# Patient Record
Sex: MALE | Race: WHITE | NOT HISPANIC OR LATINO | Employment: FULL TIME | URBAN - NONMETROPOLITAN AREA
[De-identification: names, ages, dates, MRNs, and addresses within clinical notes are randomized per-mention and may not be internally consistent; named-entity substitution may affect disease eponyms.]

---

## 2017-12-14 ENCOUNTER — HISTORY (OUTPATIENT)
Dept: FAMILY MEDICINE | Facility: OTHER | Age: 22
End: 2017-12-14

## 2017-12-14 ENCOUNTER — OFFICE VISIT - GICH (OUTPATIENT)
Dept: FAMILY MEDICINE | Facility: OTHER | Age: 22
End: 2017-12-14

## 2017-12-14 ENCOUNTER — HOSPITAL ENCOUNTER (OUTPATIENT)
Dept: RADIOLOGY | Facility: OTHER | Age: 22
End: 2017-12-14
Attending: NURSE PRACTITIONER

## 2017-12-14 DIAGNOSIS — R07.81 PLEURODYNIA: ICD-10-CM

## 2017-12-14 DIAGNOSIS — R23.8 OTHER SKIN CHANGES: ICD-10-CM

## 2018-02-09 VITALS — HEART RATE: 76 BPM | WEIGHT: 277.25 LBS | DIASTOLIC BLOOD PRESSURE: 78 MMHG | SYSTOLIC BLOOD PRESSURE: 104 MMHG

## 2018-02-12 NOTE — PATIENT INSTRUCTIONS
Patient Information     Patient Name MRN Fortunato Brown [id number] Male 1995      Patient Instructions by Jayashree Gonsalves NP at 2017  1:30 PM     Author:  Jayashree Gonsalves NP Service:  (none) Author Type:  PHYS- Nurse Practitioner     Filed:  2017  2:09 PM Encounter Date:  2017 Status:  Signed     :  Jayashree Gonsalves NP (PHYS- Nurse Practitioner)            Use ice as needed for pain--avoid twisting or strain    Follow-up with chiropractor as discussed--- return to clinic if no improvement with physical therapy

## 2018-02-12 NOTE — NURSING NOTE
Patient Information     Patient Name MRN Fortunato Brown [id number] Male 1995      Nursing Note by Ursula Hughes at 2017  1:30 PM     Author:  Ursula Hughes Service:  (none) Author Type:  (none)     Filed:  2017  1:35 PM Encounter Date:  2017 Status:  Signed     :  Ursula Hughes            Patient presents to clinic today for left sided rib pain in front and back. Patient states it started about 3-4 days ago. He states he hasn't sustained an injury.    Ursula Hughes LPN...................2017  1:23 PM

## 2018-02-12 NOTE — PROGRESS NOTES
"Patient Information     Patient Name MRN Sex Fortunato Campos [id number] Male 1995      Progress Notes by Jayashree Gonsalves NP at 2017  1:30 PM     Author:  Jayashree Gonsalves NP Service:  (none) Author Type:  PHYS- Nurse Practitioner     Filed:  2017  5:55 PM Encounter Date:  2017 Status:  Signed     :  Jayashree Gonslaves NP (PHYS- Nurse Practitioner)            SUBJECTIVE:    Fortunato Ocasio is a 22 y.o. male who presents for 4 day history of left-sided lower anterior rib pain that radiates laterally, denies any injury or fall. Works in  shop. Denies any recent respiratory illness or frequent cough.  Denies any shortness of breath. Pain worse with upper body activity and lifting, does not bother when sitting still. No tobacco use.  Also wanted to look at a small purple lesion on his left forearm, reports his been there for a long time possibly 10 years, is not tender painful or has changed.    HPI    Allergies      Allergen   Reactions     Penicillins  *Unknown     All \"cillins\"    ,   Family History      Problem  Relation Age of Onset     Diabetes Mother    ,   No current outpatient prescriptions on file prior to visit.     No current facility-administered medications on file prior to visit.    , No current outpatient prescriptions on file.  Medications have been reviewed by me and are current to the best of my knowledge and ability., No past medical history on file.,   Patient Active Problem List      Diagnosis Date Noted     Venous lake 2017   ,   Past Surgical History:      Procedure  Laterality Date     TONSIL AND ADENOIDECTOMY       WISDOM TEETH EXTRACTION      and   Social History       Substance Use Topics         Smoking status:   Never Smoker     Smokeless tobacco:   Never Used     Alcohol use   Yes      Comment: Occ        REVIEW OF SYSTEMS:  Review of Systems   Constitutional: Negative.    HENT: Negative.    Eyes: Negative.    Respiratory: Negative.  "   Cardiovascular: Negative.    Gastrointestinal: Negative.    Genitourinary: Negative.    Musculoskeletal: Positive for joint pain.   Skin: Negative.    Neurological: Negative.    Endo/Heme/Allergies: Negative.    Psychiatric/Behavioral: Negative.        OBJECTIVE:  /78 (Cuff Site: Right Arm, Position: Sitting, Cuff Size: Adult Large)  Pulse 76  Wt 125.8 kg (277 lb 4 oz)    EXAM:   Physical Exam   Constitutional: He is oriented to person, place, and time and well-developed, well-nourished, and in no distress.   Cardiovascular: Normal rate.    Pulmonary/Chest: Effort normal and breath sounds normal. No respiratory distress. He has no wheezes. He has no rales. He exhibits tenderness.   Musculoskeletal: Normal range of motion. He exhibits tenderness. He exhibits no edema or deformity.   Tenderness with palpation left lower anterior ribs, no crepitus, erythema or edema.   Neurological: He is alert and oriented to person, place, and time. Gait normal.   Skin: Skin is warm and dry.   4 mm purple vascular appearing lesion left forearm, no surrounding edema or erythema, blanches with palpation  nontender  appearance of venous lake   Psychiatric: Mood, memory, affect and judgment normal.   Nursing note and vitals reviewed.      ASSESSMENT/PLAN:    ICD-10-CM    1. Rib pain on left side R07.81 XR RIBS LEFT AND PA CHEST MINIMUM 3 VIEWS   2. Venous lake R23.8         Plan:  Recommend chiropractic or physical therapy--plans to used on Alex for deep tissue and myofascial release  use ice, ibuprofen avoid twisting or strain    10 year stable vascular purple lesion on left forearm--looks like venous lake--- recommended to monitor  and could follow-up with dermatology for definitive diagnosis--declines                      PROCEDURE:  XR RIBS LEFT AND PA CHEST MINIMUM 3 VIEWS     HISTORY: Rib pain on left side.     COMPARISON:  None.     FINDINGS:   The cardiac silhouette is normal in size. The pulmonary vasculature is  normal.  There is a probable nipple shadow in the left lung base. The lungs are otherwise clear. No pleural effusion or pneumothorax.     3 views of the left ribs were obtained. No acute rib fracture or destructive osseous lesion.     IMPRESSION:  Negative chest and left rib x-rays.       Electronically Signed By: Saima Pruett M.D. on 12/14/2017 2:03 PM

## 2018-11-19 ENCOUNTER — OFFICE VISIT (OUTPATIENT)
Dept: FAMILY MEDICINE | Facility: OTHER | Age: 23
End: 2018-11-19
Attending: FAMILY MEDICINE
Payer: COMMERCIAL

## 2018-11-19 VITALS — DIASTOLIC BLOOD PRESSURE: 64 MMHG | RESPIRATION RATE: 16 BRPM | SYSTOLIC BLOOD PRESSURE: 122 MMHG

## 2018-11-19 DIAGNOSIS — R19.7 DIARRHEA OF PRESUMED INFECTIOUS ORIGIN: Primary | ICD-10-CM

## 2018-11-19 PROBLEM — R23.8 VENOUS LAKE: Status: ACTIVE | Noted: 2017-12-14

## 2018-11-19 LAB
ALBUMIN SERPL-MCNC: 4.4 G/DL (ref 3.5–5.7)
ALP SERPL-CCNC: 57 U/L (ref 34–104)
ALT SERPL W P-5'-P-CCNC: 28 U/L (ref 7–52)
ANION GAP SERPL CALCULATED.3IONS-SCNC: 6 MMOL/L (ref 3–14)
AST SERPL W P-5'-P-CCNC: 22 U/L (ref 13–39)
BASOPHILS # BLD AUTO: 0.1 10E9/L (ref 0–0.2)
BASOPHILS NFR BLD AUTO: 1 %
BILIRUB SERPL-MCNC: 1.2 MG/DL (ref 0.3–1)
BUN SERPL-MCNC: 11 MG/DL (ref 7–25)
CALCIUM SERPL-MCNC: 9.7 MG/DL (ref 8.6–10.3)
CHLORIDE SERPL-SCNC: 104 MMOL/L (ref 98–107)
CO2 SERPL-SCNC: 28 MMOL/L (ref 21–31)
CREAT SERPL-MCNC: 0.86 MG/DL (ref 0.7–1.3)
DIFFERENTIAL METHOD BLD: NORMAL
EOSINOPHIL # BLD AUTO: 0 10E9/L (ref 0–0.7)
EOSINOPHIL NFR BLD AUTO: 0.2 %
ERYTHROCYTE [DISTWIDTH] IN BLOOD BY AUTOMATED COUNT: 11.9 % (ref 10–15)
GFR SERPL CREATININE-BSD FRML MDRD: >90 ML/MIN/1.7M2
GLUCOSE SERPL-MCNC: 80 MG/DL (ref 70–105)
HCT VFR BLD AUTO: 47.2 % (ref 40–53)
HGB BLD-MCNC: 16.7 G/DL (ref 13.3–17.7)
IMM GRANULOCYTES # BLD: 0 10E9/L (ref 0–0.4)
IMM GRANULOCYTES NFR BLD: 0.2 %
LYMPHOCYTES # BLD AUTO: 1.5 10E9/L (ref 0.8–5.3)
LYMPHOCYTES NFR BLD AUTO: 24.5 %
MCH RBC QN AUTO: 30.9 PG (ref 26.5–33)
MCHC RBC AUTO-ENTMCNC: 35.4 G/DL (ref 31.5–36.5)
MCV RBC AUTO: 87 FL (ref 78–100)
MONOCYTES # BLD AUTO: 0.6 10E9/L (ref 0–1.3)
MONOCYTES NFR BLD AUTO: 9.4 %
NEUTROPHILS # BLD AUTO: 3.9 10E9/L (ref 1.6–8.3)
NEUTROPHILS NFR BLD AUTO: 64.7 %
PLATELET # BLD AUTO: 213 10E9/L (ref 150–450)
POTASSIUM SERPL-SCNC: 4.1 MMOL/L (ref 3.5–5.1)
PROT SERPL-MCNC: 6.7 G/DL (ref 6.4–8.9)
RBC # BLD AUTO: 5.41 10E12/L (ref 4.4–5.9)
SODIUM SERPL-SCNC: 138 MMOL/L (ref 134–144)
TSH SERPL DL<=0.05 MIU/L-ACNC: 2.19 IU/ML (ref 0.34–5.6)
WBC # BLD AUTO: 6.1 10E9/L (ref 4–11)

## 2018-11-19 PROCEDURE — 99213 OFFICE O/P EST LOW 20 MIN: CPT | Performed by: FAMILY MEDICINE

## 2018-11-19 PROCEDURE — 83516 IMMUNOASSAY NONANTIBODY: CPT | Performed by: FAMILY MEDICINE

## 2018-11-19 PROCEDURE — 85025 COMPLETE CBC W/AUTO DIFF WBC: CPT | Performed by: FAMILY MEDICINE

## 2018-11-19 PROCEDURE — 36415 COLL VENOUS BLD VENIPUNCTURE: CPT | Performed by: FAMILY MEDICINE

## 2018-11-19 PROCEDURE — 84443 ASSAY THYROID STIM HORMONE: CPT | Performed by: FAMILY MEDICINE

## 2018-11-19 PROCEDURE — 80053 COMPREHEN METABOLIC PANEL: CPT | Performed by: FAMILY MEDICINE

## 2018-11-19 PROCEDURE — 83516 IMMUNOASSAY NONANTIBODY: CPT | Mod: 91 | Performed by: FAMILY MEDICINE

## 2018-11-19 ASSESSMENT — ENCOUNTER SYMPTOMS
DIARRHEA: 1
CONSTIPATION: 0
FATIGUE: 0
UNEXPECTED WEIGHT CHANGE: 0
NAUSEA: 0
VOMITING: 0
HEMATOCHEZIA: 0
ABDOMINAL PAIN: 0
ABDOMINAL DISTENTION: 0

## 2018-11-19 ASSESSMENT — PAIN SCALES - GENERAL: PAINLEVEL: NO PAIN (0)

## 2018-11-19 ASSESSMENT — ANXIETY QUESTIONNAIRES
3. WORRYING TOO MUCH ABOUT DIFFERENT THINGS: NOT AT ALL
6. BECOMING EASILY ANNOYED OR IRRITABLE: NOT AT ALL
2. NOT BEING ABLE TO STOP OR CONTROL WORRYING: NOT AT ALL
GAD7 TOTAL SCORE: 0
7. FEELING AFRAID AS IF SOMETHING AWFUL MIGHT HAPPEN: NOT AT ALL
1. FEELING NERVOUS, ANXIOUS, OR ON EDGE: NOT AT ALL
IF YOU CHECKED OFF ANY PROBLEMS ON THIS QUESTIONNAIRE, HOW DIFFICULT HAVE THESE PROBLEMS MADE IT FOR YOU TO DO YOUR WORK, TAKE CARE OF THINGS AT HOME, OR GET ALONG WITH OTHER PEOPLE: NOT DIFFICULT AT ALL
5. BEING SO RESTLESS THAT IT IS HARD TO SIT STILL: NOT AT ALL

## 2018-11-19 ASSESSMENT — PATIENT HEALTH QUESTIONNAIRE - PHQ9: 5. POOR APPETITE OR OVEREATING: NOT AT ALL

## 2018-11-19 NOTE — PROGRESS NOTES
"  SUBJECTIVE:   Fortunato Ocasio is a 23 year old male who presents to clinic today for the following health issues:    HPI  Diarrhea.  Has had this for about 6 weeks now or so.  At first, he thought it was lactose related.  Would get gas form eating cheese.  Stopped all dairy and no changes to the diarrhea.  No abdomen pain.  Having 3-4 watery stools daily, no blood.  No severe odor.  No recent illness, no known exposures.  No recent antibiotics.  Weight has been falling for the last 8 months, intentionally.  Down about 25#.    There are no active problems to display for this patient.    Past Surgical History:   Procedure Laterality Date     EXTRACTION(S) DENTAL      No Comments Provided     TONSILLECTOMY, ADENOIDECTOMY, COMBINED      No Comments Provided     Social History   Substance Use Topics     Smoking status: Never Smoker     Smokeless tobacco: Never Used     Alcohol use Yes      Comment: Alcoholic Drinks/day: Occ     No current outpatient prescriptions on file.     Allergies   Allergen Reactions     Penicillins Swelling     All \"cillins\"       Review of Systems   Constitutional: Negative for fatigue and unexpected weight change.   Gastrointestinal: Positive for diarrhea. Negative for abdominal distention, abdominal pain, constipation, hematochezia, nausea and vomiting.   Allergic/Immunologic: Negative for immunocompromised state.        OBJECTIVE:     /64 (BP Location: Right arm, Patient Position: Sitting, Cuff Size: Adult Regular)  Resp 16  There is no height or weight on file to calculate BMI.  Physical Exam   Constitutional: He appears well-developed. No distress.   Cardiovascular: Normal rate, regular rhythm and normal heart sounds.  Exam reveals no gallop and no friction rub.    No murmur heard.  Abdominal: Soft. He exhibits no distension. There is no tenderness. There is no rebound and no guarding.   Skin: He is not diaphoretic.   Psychiatric: He has a normal mood and affect. His behavior is " normal. Thought content normal.           ASSESSMENT/PLAN:         (R19.7) Diarrhea of presumed infectious origin  (primary encounter diagnosis)  Comment: 6 weeks of symptoms.  Discussed with him possible causes, including some parasites.  Bacterial infections are usually not this long in duration apart from c. Diff.  Allergies, including gluten and dairy.  Lastly would be colitis.  If above testing is negative and symptoms persist,then a colonoscopy is in order.  Will have him do a complete dairy free diet for 4-6 weeks as well.  Can add on probiotics too.  Plan: Tissue transglutaminase Ab IgA and IgG, CBC and        Differential, Comprehensive Metabolic Panel,         Clostridium difficile Toxin B PCR, Ova and         Parasite Exam Routine, Giardia antigen, Stool         Culture, TSH                 Isaac Candelario MD  [facility]    Results for orders placed or performed in visit on 11/19/18   Tissue transglutaminase Ab IgA and IgG   Result Value Ref Range    Tissue Transglutaminase Antibody IgA 1 <7 U/mL    Tissue Transglutaminase Alysia IgG 1 <7 U/mL   CBC and Differential   Result Value Ref Range    WBC 6.1 4.0 - 11.0 10e9/L    RBC Count 5.41 4.4 - 5.9 10e12/L    Hemoglobin 16.7 13.3 - 17.7 g/dL    Hematocrit 47.2 40.0 - 53.0 %    MCV 87 78 - 100 fl    MCH 30.9 26.5 - 33.0 pg    MCHC 35.4 31.5 - 36.5 g/dL    RDW 11.9 10.0 - 15.0 %    Platelet Count 213 150 - 450 10e9/L    Diff Method Automated Method     % Neutrophils 64.7 %    % Lymphocytes 24.5 %    % Monocytes 9.4 %    % Eosinophils 0.2 %    % Basophils 1.0 %    % Immature Granulocytes 0.2 %    Absolute Neutrophil 3.9 1.6 - 8.3 10e9/L    Absolute Lymphocytes 1.5 0.8 - 5.3 10e9/L    Absolute Monocytes 0.6 0.0 - 1.3 10e9/L    Absolute Eosinophils 0.0 0.0 - 0.7 10e9/L    Absolute Basophils 0.1 0.0 - 0.2 10e9/L    Abs Immature Granulocytes 0.0 0 - 0.4 10e9/L   Comprehensive Metabolic Panel   Result Value Ref Range    Sodium 138 134 - 144 mmol/L     Potassium 4.1 3.5 - 5.1 mmol/L    Chloride 104 98 - 107 mmol/L    Carbon Dioxide 28 21 - 31 mmol/L    Anion Gap 6 3 - 14 mmol/L    Glucose 80 70 - 105 mg/dL    Urea Nitrogen 11 7 - 25 mg/dL    Creatinine 0.86 0.70 - 1.30 mg/dL    GFR Estimate >90 >60 mL/min/1.7m2    GFR Estimate If Black >90 >60 mL/min/1.7m2    Calcium 9.7 8.6 - 10.3 mg/dL    Bilirubin Total 1.2 (H) 0.3 - 1.0 mg/dL    Albumin 4.4 3.5 - 5.7 g/dL    Protein Total 6.7 6.4 - 8.9 g/dL    Alkaline Phosphatase 57 34 - 104 U/L    ALT 28 7 - 52 U/L    AST 22 13 - 39 U/L   TSH   Result Value Ref Range    Thyrotropin 2.19 0.34 - 5.60 IU/mL

## 2018-11-19 NOTE — LETTER
November 20, 2018      Fortunato Ocasio  [address]  [place] [address]        Dear Fortunato,       This is all normal so far.    Results for orders placed or performed in visit on 11/19/18   Tissue transglutaminase Ab IgA and IgG   Result Value Ref Range    Tissue Transglutaminase Antibody IgA 1 <7 U/mL    Tissue Transglutaminase Alysia IgG 1 <7 U/mL   CBC and Differential   Result Value Ref Range    WBC 6.1 4.0 - 11.0 10e9/L    RBC Count 5.41 4.4 - 5.9 10e12/L    Hemoglobin 16.7 13.3 - 17.7 g/dL    Hematocrit 47.2 40.0 - 53.0 %    MCV 87 78 - 100 fl    MCH 30.9 26.5 - 33.0 pg    MCHC 35.4 31.5 - 36.5 g/dL    RDW 11.9 10.0 - 15.0 %    Platelet Count 213 150 - 450 10e9/L    Diff Method Automated Method     % Neutrophils 64.7 %    % Lymphocytes 24.5 %    % Monocytes 9.4 %    % Eosinophils 0.2 %    % Basophils 1.0 %    % Immature Granulocytes 0.2 %    Absolute Neutrophil 3.9 1.6 - 8.3 10e9/L    Absolute Lymphocytes 1.5 0.8 - 5.3 10e9/L    Absolute Monocytes 0.6 0.0 - 1.3 10e9/L    Absolute Eosinophils 0.0 0.0 - 0.7 10e9/L    Absolute Basophils 0.1 0.0 - 0.2 10e9/L    Abs Immature Granulocytes 0.0 0 - 0.4 10e9/L   Comprehensive Metabolic Panel   Result Value Ref Range    Sodium 138 134 - 144 mmol/L    Potassium 4.1 3.5 - 5.1 mmol/L    Chloride 104 98 - 107 mmol/L    Carbon Dioxide 28 21 - 31 mmol/L    Anion Gap 6 3 - 14 mmol/L    Glucose 80 70 - 105 mg/dL    Urea Nitrogen 11 7 - 25 mg/dL    Creatinine 0.86 0.70 - 1.30 mg/dL    GFR Estimate >90 >60 mL/min/1.7m2    GFR Estimate If Black >90 >60 mL/min/1.7m2    Calcium 9.7 8.6 - 10.3 mg/dL    Bilirubin Total 1.2 (H) 0.3 - 1.0 mg/dL    Albumin 4.4 3.5 - 5.7 g/dL    Protein Total 6.7 6.4 - 8.9 g/dL    Alkaline Phosphatase 57 34 - 104 U/L    ALT 28 7 - 52 U/L    AST 22 13 - 39 U/L   TSH   Result Value Ref Range    Thyrotropin 2.19 0.34 - 5.60 IU/mL         Sincerely,        Isaac Candelario MD

## 2018-11-19 NOTE — NURSING NOTE
coming in with diarrhea times 6 weeks, did stop dairy not better  Ashlyn Puente LPN on 11/19/2018 at 10:28 AM

## 2018-11-19 NOTE — MR AVS SNAPSHOT
After Visit Summary   11/19/2018    Fortunato Ocasio    MRN: [id number]           Patient Information     Date Of Birth          1995        Visit Information        Provider Department      11/19/2018 10:30 AM Isaac Candelario MD [facility]        Today's Diagnoses     Diarrhea of presumed infectious origin    -  1       Follow-ups after your visit        Future tests that were ordered for you today     Open Future Orders        Priority Expected Expires Ordered    TSH Routine  11/19/2019 11/19/2018    Tissue transglutaminase Ab IgA and IgG Routine  11/19/2019 11/19/2018    CBC and Differential Routine  11/19/2019 11/19/2018    Comprehensive Metabolic Panel Routine  11/19/2019 11/19/2018    Clostridium difficile Toxin B PCR Routine  11/19/2019 11/19/2018    Ova and Parasite Exam Routine Routine  11/19/2019 11/19/2018    Giardia antigen Routine  11/19/2019 11/19/2018    Stool culture Routine  11/19/2019 11/19/2018            Who to contact     If you have questions or need follow up information about today's clinic visit or your schedule please contact [facility] directly at [phone].  Normal or non-critical lab and imaging results will be communicated to you by MyChart, letter or phone within 4 business days after the clinic has received the results. If you do not hear from us within 7 days, please contact the clinic through MyChart or phone. If you have a critical or abnormal lab result, we will notify you by phone as soon as possible.  Submit refill requests through [employer] or call your pharmacy and they will forward the refill request to us. Please allow 3 business days for your refill to be completed.          Additional Information About Your Visit        Care EveryWhere ID     This is your Care EveryWhere ID. This could be used by other organizations to access your [facility] medical records  [id number]        Your Vitals Were     Respirations                    16            Blood Pressure from Last 3 Encounters:   11/19/18 122/64   12/14/17 104/78   05/05/14 112/62    Weight from Last 3 Encounters:   12/14/17 277 lb 4 oz (125.8 kg)   11/19/13 272 lb (123.4 kg) (>99 %)*     * Growth percentiles are based on [facility] 2-20 Years data.               Primary Care Provider Office Phone # Fax #    Isaac Candelario [phone]       [address] [facility] [address]        Equal Access to Services     [facility]: Hadii kassie mcghee hadasho Soomaali, waaxda luqadaha, qaybta kaalmada aleisha, tavia busch . So [facility] [phone].    ATENCIÓN: Si habla español, tiene a pearl disposición servicios gratuitos de asistencia lingüística. Ll[facility] [phone].    We comply with applicable federal civil rights laws and [place] laws. We do not discriminate on the basis of race, color, national origin, age, disability, sex, sexual orientation, or gender identity.            Thank you!     Thank you for choosing [facility] AND [facility]  for your care. Our goal is always to provide you with excellent care. Hearing back from our patients is one way we can continue to improve our services. Please take a few minutes to complete the written survey that you may receive in the mail after your visit with us. Thank you!             Your Updated Medication List - Protect others around you: Learn how to safely use, store and throw away your medicines at www.disposemymeds.org.      Notice  As of 11/19/2018 10:51 AM    You have not been prescribed any medications.

## 2018-11-20 LAB
TTG IGA SER-ACNC: 1 U/ML
TTG IGG SER-ACNC: 1 U/ML

## 2018-11-20 ASSESSMENT — ANXIETY QUESTIONNAIRES: GAD7 TOTAL SCORE: 0

## 2019-11-22 ENCOUNTER — OFFICE VISIT (OUTPATIENT)
Dept: FAMILY MEDICINE | Facility: OTHER | Age: 24
End: 2019-11-22
Attending: FAMILY MEDICINE
Payer: COMMERCIAL

## 2019-11-22 VITALS
TEMPERATURE: 97 F | HEART RATE: 70 BPM | HEIGHT: 75 IN | WEIGHT: 282 LBS | SYSTOLIC BLOOD PRESSURE: 112 MMHG | DIASTOLIC BLOOD PRESSURE: 66 MMHG | BODY MASS INDEX: 35.06 KG/M2 | RESPIRATION RATE: 16 BRPM

## 2019-11-22 DIAGNOSIS — K64.4 EXTERNAL HEMORRHOIDS: Primary | ICD-10-CM

## 2019-11-22 PROCEDURE — 99213 OFFICE O/P EST LOW 20 MIN: CPT | Performed by: FAMILY MEDICINE

## 2019-11-22 ASSESSMENT — ENCOUNTER SYMPTOMS
BACK PAIN: 0
HEMATOCHEZIA: 0
RECTAL PAIN: 0
DIARRHEA: 0
ABDOMINAL PAIN: 0
ABDOMINAL DISTENTION: 0
ANAL BLEEDING: 1
FATIGUE: 0

## 2019-11-22 ASSESSMENT — PAIN SCALES - GENERAL: PAINLEVEL: NO PAIN (0)

## 2019-11-22 ASSESSMENT — MIFFLIN-ST. JEOR: SCORE: 2351.59

## 2019-11-22 NOTE — NURSING NOTE
Patient presents to clinic today for blood in stool since Wednesday. He says at first there wasn't much but more last night, now today not so much.  Medication reconciliation completed.  Dennise Costello CMA([facility])..................11/22/2019   1:56 PM

## 2020-03-11 ENCOUNTER — HEALTH MAINTENANCE LETTER (OUTPATIENT)
Age: 25
End: 2020-03-11

## 2020-07-14 NOTE — PROGRESS NOTES
"  SUBJECTIVE:   Fortunato Ocasio is a 24 year old male who presents to clinic today for the following health issues:    HPI  Rectal bleeding. Saw me 1 year ago with diarrhea.  That resolved but has had significant gas pains off and on since.  He changed toothpastes 3 weeks ago and all of this settled down.  No gas at all.  3 days ago had wipe hematochezia.  2nd spell yesterday, mild.  Nearly gone by today.  Has a soft bowel movement twice daily, no changes in this pattern.  No pain with them at all.  No FH of cancer or colitis.  Weight is stable.    Patient Active Problem List    Diagnosis Date Noted     Venous lake 12/14/2017     Priority: Medium     Past Surgical History:   Procedure Laterality Date     EXTRACTION(S) DENTAL      No Comments Provided     TONSILLECTOMY, ADENOIDECTOMY, COMBINED      No Comments Provided     Social History     Tobacco Use     Smoking status: Never Smoker     Smokeless tobacco: Never Used   Substance Use Topics     Alcohol use: Yes     Comment: Alcoholic Drinks/day: Occ     No current outpatient medications on file.     Allergies   Allergen Reactions     Peanuts [Nuts] Headache     Not sure, but notices migraines next day after eating peanuts.      Penicillins Swelling     All \"cillins\"       Review of Systems   Constitutional: Negative for fatigue.   Gastrointestinal: Positive for anal bleeding. Negative for abdominal distention, abdominal pain, diarrhea, hematochezia and rectal pain.   Musculoskeletal: Negative for back pain.        OBJECTIVE:     /66 (BP Location: Right arm, Patient Position: Sitting, Cuff Size: Adult Large)   Pulse 70   Temp 97  F (36.1  C) (Oral)   Resp 16   Ht 1.9 m (6' 2.8\")   Wt 127.9 kg (282 lb)   BMI 35.44 kg/m    Body mass index is 35.44 kg/m .  Physical Exam  Constitutional:       Appearance: Normal appearance.   Genitourinary:     Comments: At 6:00 there is a small, 5 mm or so, non thrombosed hemorrhoid, external.  Overlying this is slightly " macerated skin, without current active bleed  Neurological:      General: No focal deficit present.      Mental Status: He is alert and oriented to person, place, and time.   Psychiatric:         Mood and Affect: Mood normal.         Behavior: Behavior normal.         Diagnostic Test Results:  none     ASSESSMENT/PLAN:         (K64.4) External hemorrhoids  (primary encounter diagnosis)  Comment: the risk for cancer as a cause is very low.    Plan: not thrombosed.  Can use OTC products safely and follow up as needed       Isaac Candelario MD  [facility]     critical total Bilirubil level

## 2020-11-13 ENCOUNTER — OFFICE VISIT (OUTPATIENT)
Dept: FAMILY MEDICINE | Facility: OTHER | Age: 25
End: 2020-11-13
Attending: PHYSICAL MEDICINE & REHABILITATION
Payer: OTHER MISCELLANEOUS

## 2020-11-13 VITALS
OXYGEN SATURATION: 98 % | DIASTOLIC BLOOD PRESSURE: 64 MMHG | HEART RATE: 74 BPM | BODY MASS INDEX: 36.16 KG/M2 | RESPIRATION RATE: 18 BRPM | TEMPERATURE: 97.6 F | SYSTOLIC BLOOD PRESSURE: 126 MMHG | HEIGHT: 75 IN | WEIGHT: 290.8 LBS

## 2020-11-13 DIAGNOSIS — S01.80XA OPEN WOUND OF FACE, INITIAL ENCOUNTER: Primary | ICD-10-CM

## 2020-11-13 PROCEDURE — 250N000009 HC RX 250: Performed by: INTERNAL MEDICINE

## 2020-11-13 PROCEDURE — 90471 IMMUNIZATION ADMIN: CPT | Performed by: INTERNAL MEDICINE

## 2020-11-13 PROCEDURE — 90715 TDAP VACCINE 7 YRS/> IM: CPT | Performed by: INTERNAL MEDICINE

## 2020-11-13 PROCEDURE — 12013 RPR F/E/E/N/L/M 2.6-5.0 CM: CPT | Performed by: INTERNAL MEDICINE

## 2020-11-13 RX ORDER — LIDOCAINE HYDROCHLORIDE AND EPINEPHRINE 10; 10 MG/ML; UG/ML
1 INJECTION, SOLUTION INFILTRATION; PERINEURAL ONCE
Status: COMPLETED | OUTPATIENT
Start: 2020-11-13 | End: 2020-11-13

## 2020-11-13 RX ADMIN — LIDOCAINE HYDROCHLORIDE AND EPINEPHRINE 1 ML: 10; 10 INJECTION, SOLUTION INFILTRATION; PERINEURAL at 14:00

## 2020-11-13 ASSESSMENT — PAIN SCALES - GENERAL: PAINLEVEL: NO PAIN (0)

## 2020-11-13 ASSESSMENT — MIFFLIN-ST. JEOR: SCORE: 2389.69

## 2020-11-13 NOTE — PATIENT INSTRUCTIONS
INSTRUCTIONS TO PATIENTS AND RELATIVES REGARDING LACERATIONS    These instructions are for persons who have had lacerations (jagged or deep wounds or cuts) that require special bandaging and/or stitches.    1.  DO NOT REMOVE the bandage applied by the nurse or physician for 24 hours unless you have received other instructions from you physician.    AFTER 24 HOURS:    2.  KEEP AREA CLEAN, DRY AND COVERED.        *  Clean abrasions and lacerations without stitches 1 -2 times a day with soap and water.      *  DO NO  SOAK or IMMERSE LACERATION in water for a prolonged length of time such as bathing, swimming or washing dishes.      *  APPLY ANTIBIOTIC OINTMENT and a clean bandage.      DO NOT WASH AREAS WITH STITCHS OR ALLOW STITICHS TO BECOME WET. KEEP AREA CLEAN, DRY AND COVERED.      FACIAL AND SCALP LACERAATIONS DO NOT NEED A BANDAGE    3.  TREAT PAIN OR DISCOMFORT WITH:      *  Ibuprofen or Tylenol every four hours.      *  ELEVATE wound to decrease swelling and pain.    4.  WATCH FOR SIGNS OF INFECTION      *  Redness      *  Swelling      *  Pus      *  Red streaks around wound      *  Increased pain      *  Fever      In the event that any of the symptoms listed occur, call your physician or the Emergency Room.     [facility] [place]  [phone]  [facility] [place]  [phone]  [facility] IN [place]  [phone]  [facility]  [phone]  [facility] EMERGENCY ROOM  [phone]    ADDITIONAL INSTRUCTIONS

## 2020-11-13 NOTE — NURSING NOTE
"Chief Complaint   Patient presents with     Laceration     Patient is here for a laceration on the right side of his face that happened at 11:32am. Patient states an exhaust pipe cut his face.    Initial /64   Pulse 74   Temp 97.6  F (36.4  C) (Tympanic)   Resp 18   Ht 1.905 m (6' 3\")   Wt 131.9 kg (290 lb 12.8 oz)   SpO2 98%   BMI 36.35 kg/m   Estimated body mass index is 36.35 kg/m  as calculated from the following:    Height as of this encounter: 1.905 m (6' 3\").    Weight as of this encounter: 131.9 kg (290 lb 12.8 oz).  Medication Reconciliation: complete    Francie Dee LPN  "

## 2020-11-13 NOTE — PROGRESS NOTES
SUBJECTIVE:   25 year old male sustained laceration of face 1 hours ago. Nature of injury: was working with an exhaust pipe when it slipped and fell on caused a laceration on his right cheek.  He reports that the exhaust pipe was new and had never been used prior.  He has had some bleeding from it although this is minimal.  He denies significant pain.    Tetanus vaccination status reviewed: last tetanus booster just under 7 years ago, tetanus re-vaccination completed     OBJECTIVE:   Patient appears well, vitals are normal. Laceration 3.4 cm noted.  Description: clean wound edges, no foreign bodies. Neurovascular and tendon structures are intact.    ASSESSMENT:   Laceration as described.    PLAN:   Anesthesia with Lidocaine 1% with epinephrine. Wound cleansed, debrided of visible foreign material and necrotic tissue, and sutured. 11 sutures completed.  Dressing applied.  Wound care instructions provided.  Observe for any signs of infection or other problems.  Return for suture removal in 5-7 days.

## 2020-12-27 ENCOUNTER — HEALTH MAINTENANCE LETTER (OUTPATIENT)
Age: 25
End: 2020-12-27

## 2021-03-25 ENCOUNTER — HOSPITAL ENCOUNTER (OUTPATIENT)
Dept: GENERAL RADIOLOGY | Facility: OTHER | Age: 26
End: 2021-03-25
Attending: NURSE PRACTITIONER
Payer: COMMERCIAL

## 2021-03-25 ENCOUNTER — OFFICE VISIT (OUTPATIENT)
Dept: FAMILY MEDICINE | Facility: OTHER | Age: 26
End: 2021-03-25
Attending: NURSE PRACTITIONER
Payer: COMMERCIAL

## 2021-03-25 VITALS
SYSTOLIC BLOOD PRESSURE: 102 MMHG | BODY MASS INDEX: 36.68 KG/M2 | TEMPERATURE: 98.5 F | DIASTOLIC BLOOD PRESSURE: 64 MMHG | OXYGEN SATURATION: 98 % | HEIGHT: 75 IN | WEIGHT: 295 LBS | HEART RATE: 81 BPM | RESPIRATION RATE: 18 BRPM

## 2021-03-25 DIAGNOSIS — S99.911A ANKLE INJURY, RIGHT, INITIAL ENCOUNTER: Primary | ICD-10-CM

## 2021-03-25 PROCEDURE — 73610 X-RAY EXAM OF ANKLE: CPT | Mod: RT

## 2021-03-25 PROCEDURE — 99213 OFFICE O/P EST LOW 20 MIN: CPT | Performed by: NURSE PRACTITIONER

## 2021-03-25 ASSESSMENT — PAIN SCALES - GENERAL: PAINLEVEL: NO PAIN (0)

## 2021-03-25 ASSESSMENT — MIFFLIN-ST. JEOR: SCORE: 2408.74

## 2021-03-26 NOTE — PROGRESS NOTES
"HPI:    Fortunato Ocasio is a 25 year old male who presents to [facility] today for an injury to his right ankle. The injury occurred on Saturday. Patient was walking outside in a parkinglot and miss stepped with his left foot and tried to compensate with his right and believes that he rolled his right ankle. Rating his pain 1-2/10. The patient applied ice on Saturday and Sunday. Patient reports taking tylenol on Sunday.     History reviewed. No pertinent past medical history.  Past Surgical History:   Procedure Laterality Date     EXTRACTION(S) DENTAL      No Comments Provided     TONSILLECTOMY, ADENOIDECTOMY, COMBINED      No Comments Provided     Social History     Tobacco Use     Smoking status: Never Smoker     Smokeless tobacco: Never Used   Substance Use Topics     Alcohol use: Yes     Comment: Alcoholic Drinks/day: Occ     No current outpatient medications on file.     Allergies   Allergen Reactions     Peanuts [Nuts] Headache     Not sure, but notices migraines next day after eating peanuts.      Penicillins Swelling     All \"cillins\"         Past medical history, past surgical history, current medications and allergies reviewed and accurate to the best of my knowledge.        ROS:  Refer to HPI    /64   Pulse 81   Temp 98.5  F (36.9  C) (Tympanic)   Resp 18   Ht 1.905 m (6' 3\")   Wt 133.8 kg (295 lb)   SpO2 98%   BMI 36.87 kg/m      EXAM:  General Appearance: Well appearing male, appropriate appearance for age. No acute distress  Musculoskeletal:  Equal movement of bilateral upper extremities.  Equal movement of bilateral lower extremities. Patient is able to dorsiflex and plantarflex his right foot reporting mild pain.  Normal gait.    Dermatological: no rashes noted of exposed skin. NNo erythema or ecchymosis of right ankle. Swelling noted over the right lateral malleolus.   Psychological: normal affect, alert, oriented, and pleasant.         Xray:  Results for orders placed or performed in " visit on 03/25/21   XR Ankle Right G/E 3 Views     Status: None    Narrative    PROCEDURE:  XR ANKLE RT G/E 3 VW    HISTORY: Ankle injury, right, initial encounter    COMPARISON:  None.    TECHNIQUE:  3 views of the right ankle were obtained.    FINDINGS:  No fracture or dislocation is identified. The joint spaces  are preserved.  There are some soft tissue calcifications just distal  to the medial and lateral malleolar line which appear chronic.      Impression    IMPRESSION: No acute fracture.      THERESE DAVIS MD           ASSESSMENT/PLAN:  1. Ankle injury, right, initial encounter    - XR Ankle Right G/E 3 Views    Xray films and radiology report reviewed.     Discussed xray results with the patient and informed him that there does not appear to be an acute fracture or dislocation on xray.     Discussed with the patient that this is most likely a sprain/strain of his right ankle.     The patient's ankle was wrapped with an ACE bandage during today's visit.     Instructed the patient to rest, elevate and ice his right ankle.     May use over-the-counter Tylenol or ibuprofen PRN    Discussed warning signs/symptoms indicative of need to f/u    Follow up if symptoms persist or worsen or concerns      I explained my diagnostic considerations and recommendations to the patient, who voiced understanding and agreement with the treatment plan. All questions were answered. We discussed potential side effects of any prescribed or recommended therapies, as well as expectations for response to treatments.    Disclaimer:  This note consists of words and symbols derived from keyboarding, dictation, or using voice recognition software. As a result, there may be errors in the script that have gone undetected. Please consider this when interpreting information found in this note.

## 2021-03-26 NOTE — PATIENT INSTRUCTIONS
Patient Education     Self-Care for Strains and Sprains  Most minor strains and sprains can be treated with self-care. Recovering from a strain or sprain may take 6 to 8 weeks. Your self-care goal is to reduce pain and immobilize the injury to speed healing.   Support the injured area  Wrapping the injured area provides support for short, necessary activities. Be careful not to wrap the area too tightly. This could cut off the blood supply.     Support a wrist, elbow, or shoulder with a sling.    Wrap an ankle or knee with an elastic bandage.    Tape a finger or toe to the one next to it.  Use cold and heat  Cold reduces swelling. Both cold and heat reduce pain. Heat should not be used in the initial treatment of the injury. When using cold or heat, always place a thin towel between the pack and your skin.     Apply ice or a cold pack 10 to 15 minutes every hour you re awake for the first 2 days.    After the swelling goes down, use cold or heat to control pain. Don t use heat late in the day, since it can cause swelling when you re not active.  Rest and elevate  Rest and elevation help your injury heal faster.    Raise the injured area above your heart level.    Keep the injured area from moving.    Limit the use of the joint or limb.  Use medicine    Aspirin reduces pain and swelling. (Note: Don t give aspirin to a child 18 or younger unless prescribed by the doctor.)    Non-steroidal anti-inflammatory medicines, such as ibuprofen, may reduce pain and swelling, as well. Ask your healthcare provider for advice.    When to call your healthcare provider  Call your healthcare provider if:    The injured joint won t move, or bones make a grating sound when they move    You can t put weight on the injured area, even after 24 hours    The injured body part is cold, blue, tingling, or numb    The joint or limb appears bent or crooked.    Pain increases or doesn t improve in 4 days    When pressing along the injured  area, you notice a spot that is especially painful  [employer] last reviewed this educational content on 5/1/2018 2000-2020 [employer]. All rights reserved. This information is not intended as a substitute for professional medical care. Always follow your healthcare professional's instructions.           Patient Education     Treating Strains and Sprains    Strains and sprains happen when muscles or other soft tissues near your bones stretch or tear. These injuries can cause bruising, swelling, and pain. To ease your discomfort and speed the healing of your strain or sprain, follow the tips below. Remember, a strain or sprain can take 6 to 8 weeks to heal.  Important Note: Do not give aspirin to children or teens without discussing it with your healthcare provider first.  Ice first, heat later    Use ice for the first 24 to 48 hours after injury. Ice helps prevent swelling and reduce pain. Ice the injury for no more than 20 minutes at a time and allow at least 20 minutes between icing sessions.    Apply heat after the first 72 hours, once the swelling has gone down. Heat relaxes muscles and increases blood flow. Soak the injured area in warm water or use a heating pad set on low for no more than 15 minutes at a time.  Wrap and elevate    Wrap an injured limb firmly with an elastic bandage. This provides support and helps prevent swelling. Don t wear an elastic bandage overnight. Watch for tingling, numbness, or increased pain. Remove the bandage immediately if any of these occurs.    Elevate the injured area to help reduce swelling and throbbing. It s best to raise an injured limb above the level of your heart.  Medicines    Over-the-counter medicines such as acetaminophen or ibuprofen can help reduce pain. Some also help reduce swelling.    Take medicine only as directed.    Rest the area even if medicines are controlling the pain.  Rest    Rest the injured area by not using it for 24 hours.    When  you re ready, return slowly to your normal activities. Rest the injured area often.    Don t use or walk on an injured limb if it hurts.  [employer] last reviewed this educational content on 1/1/2018 2000-2020 [employer]. All rights reserved. This information is not intended as a substitute for professional medical care. Always follow your healthcare professional's instructions.

## 2021-03-26 NOTE — NURSING NOTE
"Chief Complaint   Patient presents with     Musculoskeletal Problem     ankle     Patient is here for pain in his right ankle that started Saturday. Patient states he fell on it and heard a crack. Patient tried ice on Sunday but not since.     Initial /64   Pulse 81   Temp 98.5  F (36.9  C) (Tympanic)   Resp 18   Ht 1.905 m (6' 3\")   Wt 133.8 kg (295 lb)   SpO2 98%   BMI 36.87 kg/m   Estimated body mass index is 36.87 kg/m  as calculated from the following:    Height as of this encounter: 1.905 m (6' 3\").    Weight as of this encounter: 133.8 kg (295 lb).  Medication Reconciliation: complete    Francie Dee LPN  "

## 2021-04-25 ENCOUNTER — HEALTH MAINTENANCE LETTER (OUTPATIENT)
Age: 26
End: 2021-04-25

## 2021-10-09 ENCOUNTER — HEALTH MAINTENANCE LETTER (OUTPATIENT)
Age: 26
End: 2021-10-09

## 2021-10-17 ENCOUNTER — OFFICE VISIT (OUTPATIENT)
Dept: FAMILY MEDICINE | Facility: OTHER | Age: 26
End: 2021-10-17
Attending: NURSE PRACTITIONER
Payer: COMMERCIAL

## 2021-10-17 VITALS
BODY MASS INDEX: 36.29 KG/M2 | OXYGEN SATURATION: 97 % | WEIGHT: 291.9 LBS | HEART RATE: 94 BPM | SYSTOLIC BLOOD PRESSURE: 128 MMHG | DIASTOLIC BLOOD PRESSURE: 78 MMHG | RESPIRATION RATE: 18 BRPM | TEMPERATURE: 98.5 F | HEIGHT: 75 IN

## 2021-10-17 DIAGNOSIS — S39.012A STRAIN OF LUMBAR REGION, INITIAL ENCOUNTER: Primary | ICD-10-CM

## 2021-10-17 PROCEDURE — 99213 OFFICE O/P EST LOW 20 MIN: CPT | Performed by: FAMILY MEDICINE

## 2021-10-17 PROCEDURE — 250N000011 HC RX IP 250 OP 636: Performed by: FAMILY MEDICINE

## 2021-10-17 PROCEDURE — 96372 THER/PROPH/DIAG INJ SC/IM: CPT | Performed by: FAMILY MEDICINE

## 2021-10-17 RX ORDER — KETOROLAC TROMETHAMINE 30 MG/ML
30 INJECTION, SOLUTION INTRAMUSCULAR; INTRAVENOUS ONCE
Status: COMPLETED | OUTPATIENT
Start: 2021-10-17 | End: 2021-10-17

## 2021-10-17 RX ADMIN — KETOROLAC TROMETHAMINE 30 MG: 30 INJECTION, SOLUTION INTRAMUSCULAR; INTRAVENOUS at 18:51

## 2021-10-17 ASSESSMENT — PAIN SCALES - GENERAL: PAINLEVEL: EXTREME PAIN (8)

## 2021-10-17 ASSESSMENT — MIFFLIN-ST. JEOR: SCORE: 2389.68

## 2021-10-17 NOTE — NURSING NOTE
"Chief Complaint   Patient presents with     Back Pain     lower back pain, injured today at noon, pain 8/10, injured from stepping out of a camper and tripping     Patient presents to [facility] with low back pain. He had an injury today at noon where he was stepping out of a camper and got his foot caught and \"kind of\" tripped but didn't fall. He feels like he \"tena' his back. He denies past history of back problems. Pain 8/10. He took 30 mL of liquid Tylenol at 2:30 pm today - he is unsure of the mg/mL.    Initial /78 (BP Location: Left arm, Patient Position: Sitting, Cuff Size: Adult Large)   Pulse 94   Temp 98.5  F (36.9  C) (Tympanic)   Resp 18   Ht 1.905 m (6' 3\")   Wt 132.4 kg (291 lb 14.4 oz)   SpO2 97%   BMI 36.48 kg/m   Estimated body mass index is 36.48 kg/m  as calculated from the following:    Height as of this encounter: 1.905 m (6' 3\").    Weight as of this encounter: 132.4 kg (291 lb 14.4 oz).     FOOD SECURITY SCREENING QUESTIONS  Hunger Vital Signs:  Within the past 12 months we worried whether our food would run out before we got money to buy more. Never  Within the past 12 months the food we bought just didn't last and we didn't have money to get more. Never      Medication Reconciliation: Complete      Gertrude Aquino LPN   "

## 2021-10-17 NOTE — PATIENT INSTRUCTIONS
Ibuprofen 600 mg every 6 hours.    Two Tylenol ES every 6 hour.    Ice.    Stretch.    Return to clinic if not improving.

## 2021-10-17 NOTE — PROGRESS NOTES
"  CHIEF COMPLAINT    Low back injury today.      HISTORY    The patient came down 2 steps from a camper awkwardly with something in his hands and landed hard.  He developed sudden low back pain, pretty much equal on both sides.  Does not radiate to legs.  He does have pain and stiffness.  Worse when standing, less when lying down.  Not having numbness or bowel or bladder problems, just a lot of pain.      REVIEW OF SYSTEMS    No fever.  No cough or shortness of breath.  No chest pain.  No abdominal pain.      EXAM  /78 (BP Location: Left arm, Patient Position: Sitting, Cuff Size: Adult Large)   Pulse 94   Temp 98.5  F (36.9  C) (Tympanic)   Resp 18   Ht 1.905 m (6' 3\")   Wt 132.4 kg (291 lb 14.4 oz)   SpO2 97%   BMI 36.48 kg/m      Large young man, uncomfortable.  HEENT atraumatic.  Neck nontender.  Chest nonlabored breathing.  Abdomen somewhat protuberant.  Back-tenderness in lower lumbar paraspinous regions and over lower lumbar spine midline.  Unable to do much of a straight leg raise test as he gets low back pain with extension at the knee bilaterally.  Walking with some discomfort but under his own power.       (S39.012A) Strain of lumbar region, initial encounter  (primary encounter diagnosis)  Comment:   Plan: ketorolac (TORADOL) injection 30 mg        Patient advised on oral pain medications.  Try some ice and stretching.  If he has persistent or worsening pain he is to have follow-up in the clinic in the next day or so.      "

## 2021-10-24 ENCOUNTER — ALLIED HEALTH/NURSE VISIT (OUTPATIENT)
Dept: FAMILY MEDICINE | Facility: OTHER | Age: 26
End: 2021-10-24
Attending: FAMILY MEDICINE
Payer: COMMERCIAL

## 2021-10-24 DIAGNOSIS — G44.209 TENSION-TYPE HEADACHE, NOT INTRACTABLE, UNSPECIFIED CHRONICITY PATTERN: ICD-10-CM

## 2021-10-24 DIAGNOSIS — R07.0 THROAT PAIN: Primary | ICD-10-CM

## 2021-10-24 PROCEDURE — U0003 INFECTIOUS AGENT DETECTION BY NUCLEIC ACID (DNA OR RNA); SEVERE ACUTE RESPIRATORY SYNDROME CORONAVIRUS 2 (SARS-COV-2) (CORONAVIRUS DISEASE [COVID-19]), AMPLIFIED PROBE TECHNIQUE, MAKING USE OF HIGH THROUGHPUT TECHNOLOGIES AS DESCRIBED BY CMS-2020-01-R: HCPCS | Mod: ZL

## 2021-10-24 PROCEDURE — C9803 HOPD COVID-19 SPEC COLLECT: HCPCS

## 2021-10-24 NOTE — NURSING NOTE
Patient swabbed for COVID-19 testing for headache and sore throat.  Bernie Dye LPN on 10/24/2021 at 1:30 PM

## 2021-10-25 LAB — SARS-COV-2 RNA RESP QL NAA+PROBE: POSITIVE

## 2021-10-30 ENCOUNTER — HOSPITAL ENCOUNTER (EMERGENCY)
Facility: OTHER | Age: 26
Discharge: HOME | End: 2021-10-30
Attending: EMERGENCY MEDICINE | Admitting: EMERGENCY MEDICINE
Payer: COMMERCIAL

## 2021-10-30 ENCOUNTER — APPOINTMENT (OUTPATIENT)
Dept: GENERAL RADIOLOGY | Facility: OTHER | Age: 26
End: 2021-10-30
Attending: EMERGENCY MEDICINE
Payer: COMMERCIAL

## 2021-10-30 VITALS
DIASTOLIC BLOOD PRESSURE: 81 MMHG | RESPIRATION RATE: 16 BRPM | OXYGEN SATURATION: 94 % | HEART RATE: 116 BPM | TEMPERATURE: 100.9 F | BODY MASS INDEX: 34.19 KG/M2 | SYSTOLIC BLOOD PRESSURE: 124 MMHG | WEIGHT: 275 LBS | HEIGHT: 75 IN

## 2021-10-30 DIAGNOSIS — U07.1 INFECTION DUE TO 2019 NOVEL CORONAVIRUS: ICD-10-CM

## 2021-10-30 DIAGNOSIS — Z20.822 SUSPECTED COVID-19 VIRUS INFECTION: ICD-10-CM

## 2021-10-30 PROCEDURE — 999N000157 HC STATISTIC RCP TIME EA 10 MIN

## 2021-10-30 PROCEDURE — 99283 EMERGENCY DEPT VISIT LOW MDM: CPT | Performed by: EMERGENCY MEDICINE

## 2021-10-30 PROCEDURE — 71045 X-RAY EXAM CHEST 1 VIEW: CPT

## 2021-10-30 PROCEDURE — 99283 EMERGENCY DEPT VISIT LOW MDM: CPT | Mod: 25 | Performed by: EMERGENCY MEDICINE

## 2021-10-30 RX ORDER — ALBUTEROL SULFATE 90 UG/1
2 AEROSOL, METERED RESPIRATORY (INHALATION) EVERY 6 HOURS PRN
Qty: 8 G | Refills: 0 | Status: SHIPPED | OUTPATIENT
Start: 2021-10-30 | End: 2022-04-04

## 2021-10-30 ASSESSMENT — ENCOUNTER SYMPTOMS
CHILLS: 0
ARTHRALGIAS: 0
DIARRHEA: 1
SHORTNESS OF BREATH: 1
FEVER: 1
AGITATION: 0
VOMITING: 0
LIGHT-HEADEDNESS: 0
DYSURIA: 0

## 2021-10-30 ASSESSMENT — MIFFLIN-ST. JEOR: SCORE: 2313.02

## 2021-10-30 NOTE — ED TRIAGE NOTES
"ED Nursing Triage Note (General)   ________________________________    Fortunato Ocasio is a 26 year old Male that presents to triage private car  With history of  SOB AND COUGH reported by patient.  Dx with Covid 6 days ago.  He is worried about bronchitis or pneunomia.  States was not having much for COVID symptoms until now.  Home pulse oxygen level was 93%  /81   Pulse 116   Temp (!) 100.9  F (38.3  C) (Tympanic)   Resp 16   Ht 1.905 m (6' 3\")   Wt 124.7 kg (275 lb)   SpO2 94%   BMI 34.37 kg/m  t  Patient appears alert  and oriented, in no acute distress., and cooperative and pleasant behavior.    GCS Eye Opening = 4=Spontaneous  Airway: intact  Breathing noted as Normal.  Circulation Normal  Skin normal  Action taken:  Triage to critical care immediately      PRE HOSPITAL PRIOR LIVING SITUATION Spouse  "

## 2021-10-31 NOTE — ED NOTES
Patient discharged home, AVS reviewed with patient. New medication education given. Verbalized understanding of all instruction.

## 2021-10-31 NOTE — ED PROVIDER NOTES
"  History     Chief Complaint   Patient presents with     Shortness of Breath     HPI  Fortunato Ocasio is a 26 year old male who was diagnosed with Covid about 6 days ago.  They have a pulse oximeter at home and it was getting in the low 90 level so he was just concerned.  His wife wanted him to come in and get checked out.  He says he has a mild cough.  He gets a little bit of short of breath with activity but mostly does not feel at all short of breath.  Still running some fevers but nothing bad.  Not a lot of appetite but is drinking liquids well.  Urinating normally.  Has diarrhea, about 3 stools per day.  It is not black or bloody.    Allergies:  Allergies   Allergen Reactions     Peanuts [Nuts] Headache     Not sure, but notices migraines next day after eating peanuts.      Penicillins Swelling     All \"cillins\"       Problem List:    Patient Active Problem List    Diagnosis Date Noted     Venous lake 12/14/2017     Priority: Medium        Past Medical History:    No past medical history on file.    Past Surgical History:    Past Surgical History:   Procedure Laterality Date     EXTRACTION(S) DENTAL      No Comments Provided     TONSILLECTOMY, ADENOIDECTOMY, COMBINED      No Comments Provided       Family History:    Family History   Problem Relation Age of Onset     Diabetes Mother         Diabetes       Social History:  Marital Status:  Single [1]  Social History     Tobacco Use     Smoking status: Never Smoker     Smokeless tobacco: Never Used   Vaping Use     Vaping Use: Never used   Substance Use Topics     Alcohol use: Yes     Comment: Alcoholic Drinks/day: Occ     Drug use: Never     Comment:          Medications:    albuterol (PROAIR HFA/PROVENTIL HFA/VENTOLIN HFA) 108 (90 Base) MCG/ACT inhaler  acetaminophen (TYLENOL) 32 mg/mL liquid          Review of Systems   Constitutional: Positive for fever. Negative for chills.   HENT: Negative for congestion.    Eyes: Negative for visual disturbance. " "  Respiratory: Positive for shortness of breath.    Cardiovascular: Negative for chest pain.   Gastrointestinal: Positive for diarrhea. Negative for vomiting.   Genitourinary: Negative for dysuria.   Musculoskeletal: Negative for arthralgias.   Skin: Negative for rash.   Neurological: Negative for light-headedness.   Psychiatric/Behavioral: Negative for agitation.       Physical Exam   BP: 124/81  Pulse: 116  Temp: (!) 100.9  F (38.3  C)  Resp: 16  Height: 190.5 cm (6' 3\")  Weight: 124.7 kg (275 lb)  SpO2: 94 %      Physical Exam  Vitals and nursing note reviewed.   Constitutional:       Appearance: He is well-developed.   HENT:      Head: Normocephalic and atraumatic.      Mouth/Throat:      Mouth: Mucous membranes are moist.   Eyes:      Conjunctiva/sclera: Conjunctivae normal.   Cardiovascular:      Rate and Rhythm: Normal rate and regular rhythm.      Heart sounds: Normal heart sounds.   Pulmonary:      Effort: Pulmonary effort is normal.      Breath sounds: Normal breath sounds.   Abdominal:      General: Abdomen is flat.   Skin:     General: Skin is warm and dry.   Neurological:      Mental Status: He is alert and oriented to person, place, and time.   Psychiatric:         Mood and Affect: Mood normal.         Behavior: Behavior normal.         ED Course        Procedures         Portable chest x-ray initial mild densities consistent with mild viral pneumonia.         No results found for this or any previous visit (from the past 24 hour(s)).    Medications - No data to display    Assessments & Plan (with Medical Decision Making)     I have reviewed the nursing notes.    I have reviewed the findings, diagnosis, plan and need for follow up with the patient.  Patient with known Covid.  Breathing well at this time.  Will put order for get well loop, send him home with pulse oximetry.  Also prescription for albuterol should he choose to use it.  Return if worse.    New Prescriptions    ALBUTEROL (PROAIR " HFA/PROVENTIL HFA/VENTOLIN HFA) 108 (90 BASE) MCG/ACT INHALER    Inhale 2 puffs into the lungs every 6 hours as needed for shortness of breath / dyspnea or wheezing       Final diagnoses:   Infection due to 2019 novel coronavirus   Suspected COVID-19 virus infection       10/30/2021   [facility]     Jose Schofield MD  10/30/21 2008

## 2021-10-31 NOTE — PROGRESS NOTES
Pt administered and instructed on Pulse Oximeter monitor and Get Well Loop information. Pt had good comprehension.

## 2021-11-02 ENCOUNTER — OFFICE VISIT (OUTPATIENT)
Dept: FAMILY MEDICINE | Facility: OTHER | Age: 26
End: 2021-11-02
Attending: FAMILY MEDICINE
Payer: COMMERCIAL

## 2021-11-02 VITALS
TEMPERATURE: 95.3 F | OXYGEN SATURATION: 96 % | DIASTOLIC BLOOD PRESSURE: 74 MMHG | RESPIRATION RATE: 16 BRPM | HEART RATE: 120 BPM | BODY MASS INDEX: 34.09 KG/M2 | WEIGHT: 274.2 LBS | SYSTOLIC BLOOD PRESSURE: 122 MMHG | HEIGHT: 75 IN

## 2021-11-02 DIAGNOSIS — U07.1 INFECTION DUE TO 2019 NOVEL CORONAVIRUS: Primary | ICD-10-CM

## 2021-11-02 PROCEDURE — 99213 OFFICE O/P EST LOW 20 MIN: CPT | Performed by: FAMILY MEDICINE

## 2021-11-02 ASSESSMENT — MIFFLIN-ST. JEOR: SCORE: 2309.39

## 2021-11-02 ASSESSMENT — ANXIETY QUESTIONNAIRES
GAD7 TOTAL SCORE: 0
5. BEING SO RESTLESS THAT IT IS HARD TO SIT STILL: NOT AT ALL
3. WORRYING TOO MUCH ABOUT DIFFERENT THINGS: NOT AT ALL
7. FEELING AFRAID AS IF SOMETHING AWFUL MIGHT HAPPEN: NOT AT ALL
2. NOT BEING ABLE TO STOP OR CONTROL WORRYING: NOT AT ALL
IF YOU CHECKED OFF ANY PROBLEMS ON THIS QUESTIONNAIRE, HOW DIFFICULT HAVE THESE PROBLEMS MADE IT FOR YOU TO DO YOUR WORK, TAKE CARE OF THINGS AT HOME, OR GET ALONG WITH OTHER PEOPLE: NOT DIFFICULT AT ALL
6. BECOMING EASILY ANNOYED OR IRRITABLE: NOT AT ALL
1. FEELING NERVOUS, ANXIOUS, OR ON EDGE: NOT AT ALL

## 2021-11-02 ASSESSMENT — PAIN SCALES - GENERAL: PAINLEVEL: NO PAIN (0)

## 2021-11-02 ASSESSMENT — PATIENT HEALTH QUESTIONNAIRE - PHQ9: 5. POOR APPETITE OR OVEREATING: NOT AT ALL

## 2021-11-02 NOTE — NURSING NOTE
Patient presents to clinic for ER follow up.  Rula Morales LPN ....................  11/2/2021   3:38 PM

## 2021-11-02 NOTE — PROGRESS NOTES
"  Assessment & Plan   Problem List Items Addressed This Visit     None      Visit Diagnoses     Infection due to 2019 novel coronavirus    -  Primary         This is nearly resolved.  No follow up needed unless worsening.             BMI:   Estimated body mass index is 34.27 kg/m  as calculated from the following:    Height as of this encounter: 1.905 m (6' 3\").    Weight as of this encounter: 124.4 kg (274 lb 3.2 oz).           No follow-ups on file.    Isaac Candelario MD  [facility]    Rey Bucio is a 26 year old who presents for the following health issues     HPI follow up COVID, emergency department.  He was ill starting about 10/20 or so.  Wife 2 days before then.  Tested positive on 10/24.  Currently he is mildly tired, but nearly back to normal. No cough, no fever, rare sore throat.  Mild dyspnea on exertion still, with doing several flights of stairs.    Current Outpatient Medications   Medication     acetaminophen (TYLENOL) 32 mg/mL liquid     albuterol (PROAIR HFA/PROVENTIL HFA/VENTOLIN HFA) 108 (90 Base) MCG/ACT inhaler     No current facility-administered medications for this visit.               Review of Systems         Objective    /74   Pulse 120   Temp (!) 95.3  F (35.2  C)   Resp 16   Ht 1.905 m (6' 3\")   Wt 124.4 kg (274 lb 3.2 oz)   SpO2 96%   BMI 34.27 kg/m    Body mass index is 34.27 kg/m .  Physical Exam  Constitutional:       Appearance: Normal appearance.   HENT:      Right Ear: Tympanic membrane normal.      Left Ear: Tympanic membrane normal.      Mouth/Throat:      Mouth: Mucous membranes are moist.   Cardiovascular:      Rate and Rhythm: Normal rate and regular rhythm.   Pulmonary:      Effort: Pulmonary effort is normal. No respiratory distress.      Breath sounds: Normal breath sounds. No stridor.   Neurological:      Mental Status: He is alert.   Psychiatric:         Mood and Affect: Mood normal.         Behavior: Behavior normal.         " Thought Content: Thought content normal.

## 2021-11-03 ASSESSMENT — ANXIETY QUESTIONNAIRES: GAD7 TOTAL SCORE: 0

## 2022-02-07 ENCOUNTER — OFFICE VISIT (OUTPATIENT)
Dept: FAMILY MEDICINE | Facility: OTHER | Age: 27
End: 2022-02-07
Attending: PHYSICIAN ASSISTANT
Payer: COMMERCIAL

## 2022-02-07 ENCOUNTER — HOSPITAL ENCOUNTER (OUTPATIENT)
Dept: GENERAL RADIOLOGY | Facility: OTHER | Age: 27
End: 2022-02-07
Attending: PHYSICIAN ASSISTANT
Payer: COMMERCIAL

## 2022-02-07 VITALS
DIASTOLIC BLOOD PRESSURE: 70 MMHG | BODY MASS INDEX: 36.26 KG/M2 | TEMPERATURE: 98.1 F | WEIGHT: 291.6 LBS | HEIGHT: 75 IN | SYSTOLIC BLOOD PRESSURE: 118 MMHG | RESPIRATION RATE: 12 BRPM | HEART RATE: 97 BPM | OXYGEN SATURATION: 97 %

## 2022-02-07 DIAGNOSIS — M54.16 LUMBAR RADICULOPATHY: ICD-10-CM

## 2022-02-07 DIAGNOSIS — M54.16 LUMBAR RADICULOPATHY: Primary | ICD-10-CM

## 2022-02-07 PROCEDURE — 250N000011 HC RX IP 250 OP 636: Performed by: PHYSICIAN ASSISTANT

## 2022-02-07 PROCEDURE — 72100 X-RAY EXAM L-S SPINE 2/3 VWS: CPT

## 2022-02-07 PROCEDURE — 99213 OFFICE O/P EST LOW 20 MIN: CPT | Performed by: PHYSICIAN ASSISTANT

## 2022-02-07 PROCEDURE — 96372 THER/PROPH/DIAG INJ SC/IM: CPT | Performed by: PHYSICIAN ASSISTANT

## 2022-02-07 RX ORDER — KETOROLAC TROMETHAMINE 30 MG/ML
60 INJECTION, SOLUTION INTRAMUSCULAR; INTRAVENOUS ONCE
Status: COMPLETED | OUTPATIENT
Start: 2022-02-07 | End: 2022-02-07

## 2022-02-07 RX ADMIN — KETOROLAC TROMETHAMINE 60 MG: 30 INJECTION, SOLUTION INTRAMUSCULAR at 10:23

## 2022-02-07 ASSESSMENT — PAIN SCALES - GENERAL: PAINLEVEL: MODERATE PAIN (5)

## 2022-02-07 ASSESSMENT — MIFFLIN-ST. JEOR: SCORE: 2388.32

## 2022-02-07 NOTE — NURSING NOTE
Patient presents to clinic with back and leg pain. He has seen the chiropractor once weekly for the past month for this.  Rula Morales LPN ....................  2/7/2022   9:36 AM

## 2022-02-07 NOTE — PROGRESS NOTES
Assessment & Plan     Lumbar radiculopathy  Patient with right lower extremity pain with some on and off numbness and tingling  Of right lower back pain that initially began after partial fall in October and has progressively worsened.  X-ray with mild height loss at L5-S1.  Most concerning for possible disc bulge or protrusion.  Discussed treatment options including oral Toradol and oral prednisone however patient reports he cannot swallow pills.  Toradol injection completed today for symptomatic relief.  MRI ordered for additional evaluation.  Will notify with results.  Consider physical therapy going forward.  Follow-up as needed.  - XR Lumbar Spine 2/3 Views; Future  - MR Lumbar Spine w/o Contrast; Future  - ketorolac (TORADOL) injection 60 mg        Return if symptoms worsen or fail to improve.    Carol Cody PA-C  [facility]   Fortunato is a 26 year old who presents for the following health issues     History of Present Illness       Back Pain:  He presents for follow up of back pain. Patient's back pain is a new problem.    Original cause of back pain: a near-fall  First noticed back pain: 1-4 weeks ago  Patient feels back pain: constantlyLocation of back pain:  Right lower back and right hip  Description of back pain: sharp  Back pain spreads: right buttocks, right thigh, right knee and right foot    Since patient first noticed back pain, pain is: rapidly worsening  Does back pain interfere with his job:  Yes  On a scale of 1-10 (10 being the worst), patient describes pain as:  7  What makes back pain worse: coughing and standing  Acupuncture: not tried  Acetaminophen: not helpful  Activity or exercise: not tried  Chiropractor:  Not helpful  Cold: not helpful  Heat: not tried  Massage: not tried  Muscle relaxants: not tried  NSAIDS: not helpful  Opioids: not tried  Physical Therapy: not tried  Rest: not helpful  Steroid Injection: not tried  Stretching: not  helpful  Surgery: not tried  TENS unit: not tried  Topical pain relievers: not helpful  Other healthcare providers patient is seeing for back pain: Chiropractor    He eats 0-1 servings of fruits and vegetables daily.He consumes 0 sweetened beverage(s) daily.He exercises with enough effort to increase his heart rate 9 or less minutes per day.  He exercises with enough effort to increase his heart rate 3 or less days per week.   He is taking medications regularly.     Here for evaluation of back pain.  Patient reports he initially had a partial following stepping out of a camper back in October.  He presented to the chiropractor and that provided some relief.  Approximately 4 weeks ago he had been snowmobiling which may have irritated it and he also reports that he was sitting on a chair at a restaurant for several hours and that strongly irritated his symptoms.  He also slipped on ice last Wednesday which significantly increased his pain.  On and off pain in his right lower back but is significantly painful extending throughout his entire right lower extremity.  He reports on and off numbness and tingling in his right lower extremity down to his foot.  He reports he has tried over-the-counter analgesics, icing, chiropractor, rest without any improvement.  No saddle anesthesia, loss of bowel or bladder control, footdrop, weakness.            PAST MEDICAL HISTORY: History reviewed. No pertinent past medical history.    PAST SURGICAL HISTORY:   Past Surgical History:   Procedure Laterality Date     EXTRACTION(S) DENTAL      No Comments Provided     TONSILLECTOMY, ADENOIDECTOMY, COMBINED      No Comments Provided       FAMILY HISTORY:   Family History   Problem Relation Age of Onset     Diabetes Mother         Diabetes       SOCIAL HISTORY:   Social History     Tobacco Use     Smoking status: Never Smoker     Smokeless tobacco: Never Used   Substance Use Topics     Alcohol use: Yes     Comment: Alcoholic Drinks/day: Occ  "       Allergies   Allergen Reactions     Peanuts [Nuts] Headache     Not sure, but notices migraines next day after eating peanuts.      Penicillins Swelling     All \"cillins\"     Current Outpatient Medications   Medication     acetaminophen (TYLENOL) 32 mg/mL liquid     albuterol (PROAIR HFA/PROVENTIL HFA/VENTOLIN HFA) 108 (90 Base) MCG/ACT inhaler     No current facility-administered medications for this visit.         Review of Systems   Per HPI        Objective    /70   Pulse 97   Temp 98.1  F (36.7  C)   Resp 12   Ht 1.905 m (6' 3\")   Wt 132.3 kg (291 lb 9.6 oz)   SpO2 97%   BMI 36.45 kg/m    Body mass index is 36.45 kg/m .  Physical Exam   General: Pleasant, in no apparent distress.  Musculoskeletal: Back is straight, mild tenderness to palpation of paraspinal and paravertebral muscles in lumbar region.  Negative straight leg test bilaterally.  Significantly limited flexion of right leg when knee is extended.  Full range of motion of hips bilaterally.  Antalgic gait in clinic.  Neurologic Exam: Non-focal, symmetric DTRs, normal gross motor, tone coordination and no visible tremor.  Psych: Appropriate mood and affect.    No results found for any visits on 02/07/22.          "

## 2022-02-09 ENCOUNTER — MYC MEDICAL ADVICE (OUTPATIENT)
Dept: FAMILY MEDICINE | Facility: OTHER | Age: 27
End: 2022-02-09
Payer: COMMERCIAL

## 2022-02-16 ENCOUNTER — HOSPITAL ENCOUNTER (OUTPATIENT)
Dept: MRI IMAGING | Facility: OTHER | Age: 27
Discharge: HOME | End: 2022-02-16
Attending: PHYSICIAN ASSISTANT | Admitting: PHYSICIAN ASSISTANT
Payer: COMMERCIAL

## 2022-02-16 DIAGNOSIS — M54.16 LUMBAR RADICULOPATHY: ICD-10-CM

## 2022-02-16 PROCEDURE — 72148 MRI LUMBAR SPINE W/O DYE: CPT

## 2022-02-17 ENCOUNTER — MYC MEDICAL ADVICE (OUTPATIENT)
Dept: FAMILY MEDICINE | Facility: OTHER | Age: 27
End: 2022-02-17
Payer: COMMERCIAL

## 2022-02-17 DIAGNOSIS — M51.26 PROTRUSION OF LUMBAR INTERVERTEBRAL DISC: Primary | ICD-10-CM

## 2022-02-17 DIAGNOSIS — M54.16 LUMBAR RADICULOPATHY: ICD-10-CM

## 2022-02-17 NOTE — TELEPHONE ENCOUNTER
MRI showing L5-S1 disc protrusion. PT referral placed.    Carol Cody PA-C on 2/17/2022 at 10:31 AM

## 2022-02-28 RX ORDER — PREDNISONE 20 MG/1
20 TABLET ORAL DAILY
Qty: 5 TABLET | Refills: 0 | Status: SHIPPED | OUTPATIENT
Start: 2022-02-28 | End: 2022-04-04

## 2022-02-28 RX ORDER — KETOROLAC TROMETHAMINE 10 MG/1
10 TABLET, FILM COATED ORAL EVERY 6 HOURS PRN
Qty: 20 TABLET | Refills: 0 | Status: SHIPPED | OUTPATIENT
Start: 2022-02-28 | End: 2022-04-04

## 2022-03-04 ENCOUNTER — HOSPITAL ENCOUNTER (OUTPATIENT)
Dept: PHYSICAL THERAPY | Facility: OTHER | Age: 27
Setting detail: THERAPIES SERIES
End: 2022-03-04
Attending: PHYSICIAN ASSISTANT
Payer: COMMERCIAL

## 2022-03-04 DIAGNOSIS — M54.16 LUMBAR RADICULOPATHY: ICD-10-CM

## 2022-03-04 DIAGNOSIS — M51.26 PROTRUSION OF LUMBAR INTERVERTEBRAL DISC: ICD-10-CM

## 2022-03-04 PROCEDURE — 97012 MECHANICAL TRACTION THERAPY: CPT | Mod: GP | Performed by: PHYSICAL THERAPIST

## 2022-03-04 PROCEDURE — 97162 PT EVAL MOD COMPLEX 30 MIN: CPT | Mod: GP | Performed by: PHYSICAL THERAPIST

## 2022-03-04 PROCEDURE — 97110 THERAPEUTIC EXERCISES: CPT | Mod: GP | Performed by: PHYSICAL THERAPIST

## 2022-03-04 NOTE — INITIAL ASSESSMENTS
03/04/22 1200   General Information   Type of Visit Initial OP Ortho PT Evaluation   Start of Care Date 03/04/22   Referring Physician Carol Cody   Patient/Family Goals Statement decrease pain and return to full time work   Orders Evaluate and Treat   Medical Diagnosis Protrusion of lumbar intervertebral disc M51.26 Lumbar radiculopathy M54.16    Surgical/Medical history reviewed Yes   Body Part(s)   Body Part(s) Lumbar Spine/SI   Presentation and Etiology   Pertinent history of current problem (include personal factors and/or comorbidities that impact the POC) Patient presents to physical therapy with right radicular leg pain from his lumbar spine.  Patient complains of pain, loss of strength, trouble with walking, burning numbness and tingling.  In this last fall he fell out of his camper while carrying a small object irritating his back.  At that time it was mostly back pain and then in January he slipped on some ice irritated his back again and then the radicular symptoms started.  Now it is constant radicular pain between the rating of 2 and 10 out of 10 described as sharp, aching, burning, shooting.  Patient has pain most all the time but especially when standing and sitting certain positions.  Also has pain with walking reaching overhead bending and work tasks.  He has only been able to work a couple hours a day even with pain medication and prednisone on board.  Patient improved with certain positions laying on his back and with some pain medications.  He is employed at [employer] but unable to fulfill full-time duties at this time due to pain.  Hobbies include snowmobiling which she has not been able to do in the last month and a half.  He has worked with chiropractor with minimal to no help and has tried some exercises/stretches from the chiropractor including prone press ups and child's pose.   Fall Risk Screen   Fall screen completed by PT   Is patient a fall risk? No   Abuse Screen (yes response  referral indicated)   Feels Unsafe at Home or Work/School no   Feels Threatened by Someone no   Does Anyone Try to Keep You From Having Contact with Others or Doing Things Outside Your Home? no   Physical Signs of Abuse Present no   Patient needs abuse support services and resources No   Lumbar Spine/SI Objective Findings   Observation moves well with walking , mild pain with sit to stand. sore with bed transitions.   Lumbar ROM Comment good lumbar ROM, no change with repeated.    Pelvic Screen neg   Hip Screen neg   Lumbar/Hip/Knee/Foot Strength Comments functional full strength of LE.   Hamstring Flexibility mod tight   Quadricep Flexibility mild tight   Piriformis Flexibility mild tight   SLR Pos Right   Prone Instability Test mild pain L5   Crossover SLR neg   Repeated Extension Prone no chagne   Slump Test very tight   Segmental Mobility mild tight L5 flex/ext    Sensation Testing intact   Planned Therapy Interventions   Planned Therapy Interventions joint mobilization;manual therapy;neuromuscular re-education;ROM;strengthening;stretching;transfer training   Planned Modality Interventions   Planned Modality Interventions Cryotherapy;Electrical stimulation;Hot packs;Ultrasound;Traction;TENS   Planned Modality Interventions Comments per PT clinical judgement.   Clinical Impression   Criteria for Skilled Therapeutic Interventions Met yes, treatment indicated   PT Diagnosis lumbar radiculopathy, disc injury L5/S1.   Influenced by the following impairments pain   Functional limitations due to impairments tolerance to work, walk, bend, lift.   Clinical Presentation Evolving/Changing   Clinical Decision Making (Complexity) Moderate complexity   Therapy Frequency 2 times/Week   Predicted Duration of Therapy Intervention (days/wks) 6-12 weeks   Risk & Benefits of therapy have been explained Yes   Patient, Family & other staff in agreement with plan of care Yes   Clinical Impression Comments PT will help establish HEP,  ensure good lifting technique, decrease pain, improve strength of core, help him direct his care for the injury.   ORTHO GOALS   PT Ortho Eval Goals 1;2;3   Ortho Goal 1   Goal Identifier pain   Goal Description patient will have 50% decreased pain and numbness in leg to allow work tasks for 4 hours.   Target Date 04/29/22   Ortho Goal 2   Goal Identifier HEP   Goal Description patient will maintain HEP 3x/wk at least, to improve pain and function.   Target Date 04/29/22   Ortho Goal 3   Goal Identifier flexibility   Goal Description patient willl have improved sciatic/ham mobility to 30 deg improved 90/90 ham/nerve stretch.    Target Date 04/29/22   Total Evaluation Time   PT Eval, Moderate Complexity Minutes (22836) 30

## 2022-03-08 ENCOUNTER — HOSPITAL ENCOUNTER (OUTPATIENT)
Dept: PHYSICAL THERAPY | Facility: OTHER | Age: 27
Setting detail: THERAPIES SERIES
End: 2022-03-08
Attending: FAMILY MEDICINE
Payer: COMMERCIAL

## 2022-03-08 ENCOUNTER — MYC MEDICAL ADVICE (OUTPATIENT)
Dept: FAMILY MEDICINE | Facility: OTHER | Age: 27
End: 2022-03-08
Payer: COMMERCIAL

## 2022-03-08 ENCOUNTER — TELEPHONE (OUTPATIENT)
Dept: FAMILY MEDICINE | Facility: OTHER | Age: 27
End: 2022-03-08
Payer: COMMERCIAL

## 2022-03-08 DIAGNOSIS — M51.26 PROTRUSION OF LUMBAR INTERVERTEBRAL DISC: Primary | ICD-10-CM

## 2022-03-08 PROCEDURE — 97140 MANUAL THERAPY 1/> REGIONS: CPT | Mod: GP | Performed by: PHYSICAL THERAPIST

## 2022-03-08 PROCEDURE — 97012 MECHANICAL TRACTION THERAPY: CPT | Mod: GP,XU | Performed by: PHYSICAL THERAPIST

## 2022-03-08 PROCEDURE — 97110 THERAPEUTIC EXERCISES: CPT | Mod: GP | Performed by: PHYSICAL THERAPIST

## 2022-03-08 NOTE — TELEPHONE ENCOUNTER
Mrs. Cody,    Mr. Ocasio, has started in PT, still trying to work with his back pain.  He has been only able to tolerate 30-90 minutes total of work before pain sends him home from work.   He doesn't see spine specialist Billy for another month yet.  Are there any other pain med options for him?  Current is not getting the job done.    Thank you,  Pasquale

## 2022-03-08 NOTE — TELEPHONE ENCOUNTER
Please have Fortunato follow up with myself or his PCP to discuss alternative treatment options.     Carol Cody PA-C on 3/8/2022 at 2:44 PM

## 2022-03-09 RX ORDER — NAPROXEN 500 MG/1
500 TABLET ORAL 2 TIMES DAILY WITH MEALS
Qty: 60 TABLET | Refills: 1 | Status: SHIPPED | OUTPATIENT
Start: 2022-03-09 | End: 2022-04-04

## 2022-03-10 ENCOUNTER — HOSPITAL ENCOUNTER (OUTPATIENT)
Dept: PHYSICAL THERAPY | Facility: OTHER | Age: 27
Setting detail: THERAPIES SERIES
Discharge: HOME | End: 2022-03-10
Attending: FAMILY MEDICINE
Payer: COMMERCIAL

## 2022-03-10 PROCEDURE — 97140 MANUAL THERAPY 1/> REGIONS: CPT | Mod: GP | Performed by: PHYSICAL THERAPIST

## 2022-03-10 PROCEDURE — 97012 MECHANICAL TRACTION THERAPY: CPT | Mod: GP,XU | Performed by: PHYSICAL THERAPIST

## 2022-03-11 ENCOUNTER — TRANSFERRED RECORDS (OUTPATIENT)
Dept: HEALTH INFORMATION MANAGEMENT | Facility: OTHER | Age: 27
End: 2022-03-11
Payer: COMMERCIAL

## 2022-03-29 ENCOUNTER — TRANSFERRED RECORDS (OUTPATIENT)
Dept: HEALTH INFORMATION MANAGEMENT | Facility: OTHER | Age: 27
End: 2022-03-29
Payer: COMMERCIAL

## 2022-04-01 NOTE — PATIENT INSTRUCTIONS
Preparing for Your Surgery  Getting started  A nurse will call you to review your health history and instructions. They will give you an arrival time based on your scheduled surgery time. Please be ready to share:    Your doctor's clinic name and phone number    Your medical, surgical and anesthesia history    A list of allergies and sensitivities    A list of medicines, including herbal treatments and over-the-counter drugs    Whether the patient has a legal guardian (ask how to send us the papers in advance)  Please tell us if you're pregnant--or if there's any chance you might be pregnant. Some surgeries may injure a fetus (unborn baby), so they require a pregnancy test. Surgeries that are safe for a fetus don't always need a test, and you can choose whether to have one.   If you have a child who's having surgery, please ask for a copy of Preparing for Your Child's Surgery.    Preparing for surgery    Within 30 days of surgery: Have a pre-op exam (sometimes called an H&P, or History and Physical). This can be done at a clinic or pre-operative center.  ? If you're having a , you may not need this exam. Talk to your care team.    At your pre-op exam, talk to your care team about all medicines you take. If you need to stop any medicines before surgery, ask when to start taking them again.  ? We do this for your safety. Many medicines can make you bleed too much during surgery. Some change how well surgery (anesthesia) drugs work.    Call your insurance company to let them know you're having surgery. (If you don't have insurance, call [phone].)    Call your clinic if there's any change in your health. This includes signs of a cold or flu (sore throat, runny nose, cough, rash, fever). It also includes a scrape or scratch near the surgery site.    If you have questions on the day of surgery, call your hospital or surgery center.  COVID testing  You may need to be tested for COVID-19 before having  surgery. If so, your surgical team will give you instructions for scheduling this test, separate from your preoperative history and physical.  Eating and drinking guidelines  For your safety: Unless your surgeon tells you otherwise, follow the guidelines below.    Eat and drink as usual until 8 hours before surgery. After that, no food or milk.    Drink clear liquids until 2 hours before surgery. These are liquids you can see through, like water, Gatorade and Propel Water. You may also have black coffee and tea (no cream or milk).    Nothing by mouth within 2 hours of surgery. This includes gum, candy and breath mints.    If you drink alcohol: Stop drinking it the night before surgery.    If your care team tells you to take medicine on the morning of surgery, it's okay to take it with a sip of water.  Preventing infection    Shower or bathe the night before and morning of your surgery. Follow the instructions your clinic gave you. (If no instructions, use regular soap.)    Don't shave or clip hair near your surgery site. We'll remove the hair if needed.    Don't smoke or vape the morning of surgery. You may chew nicotine gum up to 2 hours before surgery. A nicotine patch is okay.  ? Note: Some surgeries require you to completely quit smoking and nicotine. Check with your surgeon.    Your care team will make every effort to keep you safe from infection. We will:  ? Clean our hands often with soap and water (or an alcohol-based hand rub).  ? Clean the skin at your surgery site with a special soap that kills germs.  ? Give you a special gown to keep you warm. (Cold raises the risk of infection.)  ? Wear special hair covers, masks, gowns and gloves during surgery.  ? Give antibiotic medicine, if prescribed. Not all surgeries need antibiotics.  What to bring on the day of surgery    Photo ID and insurance card    Copy of your health care directive, if you have one    Glasses and hearing aides (bring cases)  ? You can't  wear contacts during surgery    Inhaler and eye drops, if you use them (tell us about these when you arrive)    CPAP machine or breathing device, if you use them    A few personal items, if spending the night    If you have . . .  ? A pacemaker, ICD (cardiac defibrillator) or other implant: Bring the ID card.  ? An implanted stimulator: Bring the remote control.  ? A legal guardian: Bring a copy of the certified (court-stamped) guardianship papers.  Please remove any jewelry, including body piercings. Leave jewelry and other valuables at home.  If you're going home the day of surgery    You must have a responsible adult drive you home. They should stay with you overnight as well.    If you don't have someone to stay with you, and you aren't safe to go home alone, we may keep you overnight. Insurance often won't pay for this.  After surgery  If it's hard to control your pain or you need more pain medicine, please call your surgeon's office.  Questions?   If you have any questions for your care team, list them here: _________________________________________________________________________________________________________________________________________________________________________ ____________________________________ ____________________________________ ____________________________________  For informational purposes only. Not to replace the advice of your health care provider. Copyright   2003, 2019 [facility]. All rights reserved. Clinically reviewed by Anitra Blue MD. [employer] [place] [id number] - REV 07/21.

## 2022-04-01 NOTE — PROGRESS NOTES
[facility]  [address]  [place] [address]  Phone: [phone]  Fax: [phone]  Primary Provider: Isaac Candelario  Pre-op Performing Provider: JADE CODY      PREOPERATIVE EVALUATION:  Today's date: 4/4/2022    Fortunato Ocasio is a 26 year old male who presents for a preoperative evaluation.    Surgical Information:  Surgery/Procedure: decompression-hemilaminotomy/discectomy Levels L5 to S1  Surgery Location: [facility]  Surgeon: Rehan  Surgery Date: 4/11/2022  Time of Surgery: TBD  Where patient plans to recover: At home with family  Fax number for surgical facility: [phone]    Type of Anesthesia Anticipated: General    Assessment & Plan     The proposed surgical procedure is considered INTERMEDIATE risk.    1. Preop general physical exam    2. Lumbar disc herniation      Preoperative COVID testing will be completed 4/7/2022. Labs stable. EKG not indicated due to patient age and health status.     Risks and Recommendations:  The patient has the following additional risks and recommendations for perioperative complications:   - No identified additional risk factors other than previously addressed    Medication Instructions:  Patient is on no chronic medications    RECOMMENDATION:      Jade Cody PA-C on 4/1/2022 at 8:21 AM  APPROVAL GIVEN to proceed with proposed procedure, without further diagnostic evaluation.    Subjective     HPI related to upcoming procedure: Patient experiencing lower back and leg radiculopathy after suffering fall in October and re injuring in February. <MRI showed L5-S1 herniation with mass effect descending S1 nerve roots on right. Met with Spinal Surgeon who suggested decompression-hemilaminotomy/discectomy Levels L5 to S1. No new or worsening symptoms at this time.     Preop Questions 4/2/2022   1. Have you ever had a heart attack or stroke? No   2. Have you ever had surgery on your heart or blood vessels, such as a  stent placement, a coronary artery bypass, or surgery on an artery in your head, neck, heart, or legs? No   3. Do you have chest pain with activity? No   4. Do you have a history of  heart failure? No   5. Do you currently have a cold, bronchitis or symptoms of other infection? No   6. Do you have a cough, shortness of breath, or wheezing? No   7. Do you or anyone in your family have previous history of blood clots? No   8. Do you or does anyone in your family have a serious bleeding problem such as prolonged bleeding following surgeries or cuts? No   9. Have you ever had problems with anemia or been told to take iron pills? No   10. Have you had any abnormal blood loss such as black, tarry or bloody stools? No   11. Have you ever had a blood transfusion? No   12. Are you willing to have a blood transfusion if it is medically needed before, during, or after your surgery? Yes   13. Have you or any of your relatives ever had problems with anesthesia? No   14. Do you have sleep apnea, excessive snoring or daytime drowsiness? No   15. Do you have any artifical heart valves or other implanted medical devices like a pacemaker, defibrillator, or continuous glucose monitor? No   16. Do you have artificial joints? No   17. Are you allergic to latex? No     Health Care Directive:  Patient does not have a Health Care Directive or Living Will: Discussed advance care planning with patient; however, patient declined at this time.    Preoperative Review of :   reviewed - no record of controlled substances prescribed.      Status of Chronic Conditions:  See problem list for active medical problems.  Problems all longstanding and stable, except as noted/documented.  See ROS for pertinent symptoms related to these conditions.      Review of Systems  CONSTITUTIONAL: NEGATIVE for fever, chills, change in weight  INTEGUMENTARY/SKIN: NEGATIVE for worrisome rashes, moles or lesions  EYES: NEGATIVE for vision changes or  "irritation  ENT/MOUTH: NEGATIVE for ear, mouth and throat problems  RESP: NEGATIVE for significant cough or SOB  CV: NEGATIVE for chest pain, palpitations or peripheral edema  GI: NEGATIVE for nausea, abdominal pain, heartburn, or change in bowel habits  : NEGATIVE for frequency, dysuria, or hematuria  MUSCULOSKELETAL:low back and right sided radicular symptoms  NEURO: NEGATIVE for weakness, dizziness or paresthesias  ENDOCRINE: NEGATIVE for temperature intolerance, skin/hair changes  HEME: NEGATIVE for bleeding problems  PSYCHIATRIC: NEGATIVE for changes in mood or affect    Patient Active Problem List    Diagnosis Date Noted     Venous lake 12/14/2017     Priority: Medium      History reviewed. No pertinent past medical history.  Past Surgical History:   Procedure Laterality Date     EXTRACTION(S) DENTAL      No Comments Provided     TONSILLECTOMY, ADENOIDECTOMY, COMBINED      No Comments Provided     No current outpatient medications on file.       Allergies   Allergen Reactions     Peanuts [Nuts] Headache     Not sure, but notices migraines next day after eating peanuts.      Penicillins Swelling     All \"cillins\"        Social History     Tobacco Use     Smoking status: Never Smoker     Smokeless tobacco: Never Used   Substance Use Topics     Alcohol use: Yes     Comment: Alcoholic Drinks/day: Occ     Family History   Problem Relation Age of Onset     Diabetes Mother         Diabetes     History   Drug Use Unknown     Comment:           Objective     /88   Pulse 97   Temp 98.7  F (37.1  C)   Resp 14   Ht 1.905 m (6' 3\")   Wt 132.5 kg (292 lb 3.2 oz)   SpO2 98%   BMI 36.52 kg/m      Physical Exam    GENERAL APPEARANCE: healthy, alert and no distress     EYES: EOMI,  PERRL     HENT: ear canals and TM's normal and nose and mouth without ulcers or lesions     NECK: no adenopathy, no asymmetry, masses, or scars and thyroid normal to palpation     RESP: lungs clear to auscultation - no rales, rhonchi " or wheezes     CV: regular rates and rhythm, normal S1 S2, no S3 or S4 and no murmur, click or rub     ABDOMEN:  soft, nontender, no HSM or masses and bowel sounds normal     SKIN: no suspicious lesions or rashes     NEURO: mentation intact and speech normal     PSYCH: mentation appears normal. and affect normal/bright     LYMPHATICS: No cervical adenopathy    No results for input(s): HGB, PLT, INR, NA, POTASSIUM, CR, A1C in the last [id number] hours.     Diagnostics:  Labs pending at this time.  Results will be reviewed when available.   No EKG required, no history of coronary heart disease, significant arrhythmia, peripheral arterial disease or other structural heart disease.     Results for orders placed or performed in visit on 04/04/22   Basic Metabolic Panel     Status: Normal   Result Value Ref Range    Sodium 140 134 - 144 mmol/L    Potassium 4.1 3.5 - 5.1 mmol/L    Chloride 101 98 - 107 mmol/L    Carbon Dioxide (CO2) 31 21 - 31 mmol/L    Anion Gap 8 3 - 14 mmol/L    Urea Nitrogen 16 7 - 25 mg/dL    Creatinine 0.96 0.70 - 1.30 mg/dL    Calcium 10.2 8.6 - 10.3 mg/dL    Glucose 93 70 - 105 mg/dL    GFR Estimate >90 >60 mL/min/1.73m2   INR     Status: Normal   Result Value Ref Range    INR 0.96 0.85 - 1.15   APTT     Status: Normal   Result Value Ref Range    aPTT 28 22 - 38 Seconds   CBC with platelets and differential     Status: None   Result Value Ref Range    WBC Count 9.2 4.0 - 11.0 10e3/uL    RBC Count 5.72 4.40 - 5.90 10e6/uL    Hemoglobin 17.7 13.3 - 17.7 g/dL    Hematocrit 50.1 40.0 - 53.0 %    MCV 88 78 - 100 fL    MCH 30.9 26.5 - 33.0 pg    MCHC 35.3 31.5 - 36.5 g/dL    RDW 11.9 10.0 - 15.0 %    Platelet Count 280 150 - 450 10e3/uL    % Neutrophils 66 %    % Lymphocytes 25 %    % Monocytes 8 %    % Eosinophils 0 %    % Basophils 1 %    % Immature Granulocytes 0 %    NRBCs per 100 WBC 0 <1 /100    Absolute Neutrophils 6.1 1.6 - 8.3 10e3/uL    Absolute Lymphocytes 2.3 0.8 - 5.3 10e3/uL    Absolute  Monocytes 0.7 0.0 - 1.3 10e3/uL    Absolute Eosinophils 0.0 0.0 - 0.7 10e3/uL    Absolute Basophils 0.1 0.0 - 0.2 10e3/uL    Absolute Immature Granulocytes 0.0 <=0.4 10e3/uL    Absolute NRBCs 0.0 10e3/uL   CBC and Differential     Status: None    Narrative    The following orders were created for panel order CBC and Differential.  Procedure                               Abnormality         Status                     ---------                               -----------         ------                     CBC with platelets and d...[[id number]]                      Final result                 Please view results for these tests on the individual orders.         Revised Cardiac Risk Index (RCRI):  The patient has the following serious cardiovascular risks for perioperative complications:   - No serious cardiac risks = 0 points     RCRI Interpretation: 0 points: Class I (very low risk - 0.4% complication rate)           Signed Electronically by: Carol Cody PA-C  Copy of this evaluation report is provided to requesting physician.

## 2022-04-04 ENCOUNTER — OFFICE VISIT (OUTPATIENT)
Dept: FAMILY MEDICINE | Facility: OTHER | Age: 27
End: 2022-04-04
Attending: PHYSICIAN ASSISTANT
Payer: COMMERCIAL

## 2022-04-04 VITALS
BODY MASS INDEX: 36.33 KG/M2 | OXYGEN SATURATION: 98 % | SYSTOLIC BLOOD PRESSURE: 126 MMHG | HEIGHT: 75 IN | RESPIRATION RATE: 14 BRPM | TEMPERATURE: 98.7 F | WEIGHT: 292.2 LBS | HEART RATE: 97 BPM | DIASTOLIC BLOOD PRESSURE: 88 MMHG

## 2022-04-04 DIAGNOSIS — Z01.818 PREOP GENERAL PHYSICAL EXAM: Primary | ICD-10-CM

## 2022-04-04 DIAGNOSIS — M51.26 LUMBAR DISC HERNIATION: ICD-10-CM

## 2022-04-04 LAB
ANION GAP SERPL CALCULATED.3IONS-SCNC: 8 MMOL/L (ref 3–14)
APTT PPP: 28 SECONDS (ref 22–38)
BASOPHILS # BLD AUTO: 0.1 10E3/UL (ref 0–0.2)
BASOPHILS NFR BLD AUTO: 1 %
BUN SERPL-MCNC: 16 MG/DL (ref 7–25)
CALCIUM SERPL-MCNC: 10.2 MG/DL (ref 8.6–10.3)
CHLORIDE BLD-SCNC: 101 MMOL/L (ref 98–107)
CO2 SERPL-SCNC: 31 MMOL/L (ref 21–31)
CREAT SERPL-MCNC: 0.96 MG/DL (ref 0.7–1.3)
EOSINOPHIL # BLD AUTO: 0 10E3/UL (ref 0–0.7)
EOSINOPHIL NFR BLD AUTO: 0 %
ERYTHROCYTE [DISTWIDTH] IN BLOOD BY AUTOMATED COUNT: 11.9 % (ref 10–15)
GFR SERPL CREATININE-BSD FRML MDRD: >90 ML/MIN/1.73M2
GLUCOSE BLD-MCNC: 93 MG/DL (ref 70–105)
HCT VFR BLD AUTO: 50.1 % (ref 40–53)
HGB BLD-MCNC: 17.7 G/DL (ref 13.3–17.7)
IMM GRANULOCYTES # BLD: 0 10E3/UL
IMM GRANULOCYTES NFR BLD: 0 %
INR PPP: 0.96 (ref 0.85–1.15)
LYMPHOCYTES # BLD AUTO: 2.3 10E3/UL (ref 0.8–5.3)
LYMPHOCYTES NFR BLD AUTO: 25 %
MCH RBC QN AUTO: 30.9 PG (ref 26.5–33)
MCHC RBC AUTO-ENTMCNC: 35.3 G/DL (ref 31.5–36.5)
MCV RBC AUTO: 88 FL (ref 78–100)
MONOCYTES # BLD AUTO: 0.7 10E3/UL (ref 0–1.3)
MONOCYTES NFR BLD AUTO: 8 %
NEUTROPHILS # BLD AUTO: 6.1 10E3/UL (ref 1.6–8.3)
NEUTROPHILS NFR BLD AUTO: 66 %
NRBC # BLD AUTO: 0 10E3/UL
NRBC BLD AUTO-RTO: 0 /100
PLATELET # BLD AUTO: 280 10E3/UL (ref 150–450)
POTASSIUM BLD-SCNC: 4.1 MMOL/L (ref 3.5–5.1)
RBC # BLD AUTO: 5.72 10E6/UL (ref 4.4–5.9)
SODIUM SERPL-SCNC: 140 MMOL/L (ref 134–144)
WBC # BLD AUTO: 9.2 10E3/UL (ref 4–11)

## 2022-04-04 PROCEDURE — 80048 BASIC METABOLIC PNL TOTAL CA: CPT | Mod: ZL | Performed by: PHYSICIAN ASSISTANT

## 2022-04-04 PROCEDURE — 99214 OFFICE O/P EST MOD 30 MIN: CPT | Performed by: PHYSICIAN ASSISTANT

## 2022-04-04 PROCEDURE — 85610 PROTHROMBIN TIME: CPT | Mod: ZL | Performed by: PHYSICIAN ASSISTANT

## 2022-04-04 PROCEDURE — 85730 THROMBOPLASTIN TIME PARTIAL: CPT | Mod: ZL | Performed by: PHYSICIAN ASSISTANT

## 2022-04-04 PROCEDURE — 36415 COLL VENOUS BLD VENIPUNCTURE: CPT | Mod: ZL | Performed by: PHYSICIAN ASSISTANT

## 2022-04-04 PROCEDURE — 85025 COMPLETE CBC W/AUTO DIFF WBC: CPT | Mod: ZL | Performed by: PHYSICIAN ASSISTANT

## 2022-04-04 ASSESSMENT — PAIN SCALES - GENERAL: PAINLEVEL: SEVERE PAIN (7)

## 2022-04-04 NOTE — NURSING NOTE
Patient presents to clinic for Pre-Op Exam.  Rula Morales LPN ....................  4/4/2022   2:53 PM

## 2022-04-07 ENCOUNTER — ALLIED HEALTH/NURSE VISIT (OUTPATIENT)
Dept: FAMILY MEDICINE | Facility: OTHER | Age: 27
End: 2022-04-07
Attending: FAMILY MEDICINE
Payer: COMMERCIAL

## 2022-04-07 DIAGNOSIS — Z01.818 PREOP GENERAL PHYSICAL EXAM: ICD-10-CM

## 2022-04-07 DIAGNOSIS — Z20.822 COVID-19 RULED OUT: Primary | ICD-10-CM

## 2022-04-07 PROCEDURE — U0005 INFEC AGEN DETEC AMPLI PROBE: HCPCS | Mod: ZL

## 2022-04-07 PROCEDURE — C9803 HOPD COVID-19 SPEC COLLECT: HCPCS

## 2022-04-07 NOTE — PROGRESS NOTES
Patient here for Covid Testing. Pre op 4/11/22, [facility].    Atiya Brennan MA on 4/7/2022 at 9:25 AM

## 2022-04-08 LAB — SARS-COV-2 RNA RESP QL NAA+PROBE: NEGATIVE

## 2022-04-19 NOTE — PROGRESS NOTES
Assessment & Plan     1. HNP (herniated nucleus pulposus), lumbar  Patient is s/p lumbar surgery 4/12/2022. He is doing very well. Incision is healing well, no signs of infection. Continue with OTC analgesics as needed. Ok to return to work next week. Keep follow up with surgeon previously scheduled in May. Follow up as needed.       Return if symptoms worsen or fail to improve.    Carol Cody PA-C  [facility] is a 26 year old who presents for the following health issues     History of Present Illness       Reason for visit:  Surgery follow up    He eats 0-1 servings of fruits and vegetables daily.He consumes 0 sweetened beverage(s) daily.He exercises with enough effort to increase his heart rate 9 or less minutes per day.  He exercises with enough effort to increase his heart rate 3 or less days per week.   He is taking medications regularly.     Patient recently underwent decompression does hemilaminotomy/discectomy at L5-S1 on 4/11/2022.  He was given Norco No. 20 tablets that was picked up on 4/12/2022. Reports he has been doing great. Healing well, minimal pain. Has been avoiding lifting, twisting, bending. Plans to return to work next week.     PAST MEDICAL HISTORY: History reviewed. No pertinent past medical history.    PAST SURGICAL HISTORY:   Past Surgical History:   Procedure Laterality Date     EXTRACTION(S) DENTAL      No Comments Provided     TONSILLECTOMY, ADENOIDECTOMY, COMBINED      No Comments Provided       FAMILY HISTORY:   Family History   Problem Relation Age of Onset     Diabetes Mother         Diabetes       SOCIAL HISTORY:   Social History     Tobacco Use     Smoking status: Never Smoker     Smokeless tobacco: Never Used   Substance Use Topics     Alcohol use: Yes     Comment: Alcoholic Drinks/day: Occ        Allergies   Allergen Reactions     Peanut (Diagnostic) Headache     Peanuts [Nuts] Headache     Not sure, but notices migraines next  "day after eating peanuts.      Penicillins Swelling     All \"cillins\"     Current Outpatient Medications   Medication     acetaminophen (TYLENOL) 325 MG tablet     HYDROcodone-acetaminophen (NORCO) 5-325 MG tablet     No current facility-administered medications for this visit.         Review of Systems   Per HPI        Objective    /78   Pulse 100   Temp 97.2  F (36.2  C)   Resp 14   Ht 1.905 m (6' 3\")   Wt 131.2 kg (289 lb 3.2 oz)   SpO2 98%   BMI 36.15 kg/m    Body mass index is 36.15 kg/m .  Physical Exam   General: Pleasant, in no apparent distress.  Skin: Well healing incision to low back without surrounding erythema, warmth, bleeding or drainage.   Psych: Appropriate mood and affect.            "

## 2022-04-20 ENCOUNTER — OFFICE VISIT (OUTPATIENT)
Dept: FAMILY MEDICINE | Facility: OTHER | Age: 27
End: 2022-04-20
Attending: PHYSICIAN ASSISTANT
Payer: COMMERCIAL

## 2022-04-20 VITALS
SYSTOLIC BLOOD PRESSURE: 116 MMHG | OXYGEN SATURATION: 98 % | HEIGHT: 75 IN | RESPIRATION RATE: 14 BRPM | TEMPERATURE: 97.2 F | DIASTOLIC BLOOD PRESSURE: 78 MMHG | HEART RATE: 100 BPM | WEIGHT: 289.2 LBS | BODY MASS INDEX: 35.96 KG/M2

## 2022-04-20 DIAGNOSIS — M51.26 HNP (HERNIATED NUCLEUS PULPOSUS), LUMBAR: Primary | ICD-10-CM

## 2022-04-20 PROCEDURE — 99212 OFFICE O/P EST SF 10 MIN: CPT | Performed by: PHYSICIAN ASSISTANT

## 2022-04-20 RX ORDER — HYDROCODONE BITARTRATE AND ACETAMINOPHEN 5; 325 MG/1; MG/1
1-2 TABLET ORAL
COMMUNITY
Start: 2022-04-11 | End: 2024-02-07

## 2022-04-20 RX ORDER — ACETAMINOPHEN 325 MG/1
325-650 TABLET ORAL
COMMUNITY
Start: 2022-04-11 | End: 2024-02-07

## 2022-04-20 ASSESSMENT — PAIN SCALES - GENERAL: PAINLEVEL: NO PAIN (1)

## 2022-04-20 NOTE — NURSING NOTE
Patient presents to clinic for follow up following back surgery. He states that he is doing great.  Rula Morales LPN ....................  4/20/2022   8:39 AM

## 2022-05-17 ENCOUNTER — TRANSFERRED RECORDS (OUTPATIENT)
Dept: HEALTH INFORMATION MANAGEMENT | Facility: OTHER | Age: 27
End: 2022-05-17
Payer: COMMERCIAL

## 2022-11-02 ENCOUNTER — MYC MEDICAL ADVICE (OUTPATIENT)
Dept: FAMILY MEDICINE | Facility: OTHER | Age: 27
End: 2022-11-02

## 2022-11-02 ENCOUNTER — OFFICE VISIT (OUTPATIENT)
Dept: FAMILY MEDICINE | Facility: OTHER | Age: 27
End: 2022-11-02
Attending: NURSE PRACTITIONER
Payer: COMMERCIAL

## 2022-11-02 VITALS
SYSTOLIC BLOOD PRESSURE: 102 MMHG | HEART RATE: 105 BPM | RESPIRATION RATE: 16 BRPM | DIASTOLIC BLOOD PRESSURE: 80 MMHG | WEIGHT: 293 LBS | TEMPERATURE: 97.6 F | OXYGEN SATURATION: 98 % | BODY MASS INDEX: 36.43 KG/M2 | HEIGHT: 75 IN

## 2022-11-02 DIAGNOSIS — R19.7 DIARRHEA OF PRESUMED INFECTIOUS ORIGIN: Primary | ICD-10-CM

## 2022-11-02 LAB
ALBUMIN SERPL-MCNC: 4.9 G/DL (ref 3.5–5.7)
ALP SERPL-CCNC: 84 U/L (ref 34–104)
ALT SERPL W P-5'-P-CCNC: 98 U/L (ref 7–52)
ANION GAP SERPL CALCULATED.3IONS-SCNC: 9 MMOL/L (ref 3–14)
AST SERPL W P-5'-P-CCNC: 56 U/L (ref 13–39)
BASOPHILS # BLD AUTO: 0.1 10E3/UL (ref 0–0.2)
BASOPHILS NFR BLD AUTO: 1 %
BILIRUB SERPL-MCNC: 2.3 MG/DL (ref 0.3–1)
BUN SERPL-MCNC: 19 MG/DL (ref 7–25)
CALCIUM SERPL-MCNC: 9.9 MG/DL (ref 8.6–10.3)
CHLORIDE BLD-SCNC: 99 MMOL/L (ref 98–107)
CO2 SERPL-SCNC: 26 MMOL/L (ref 21–31)
CREAT SERPL-MCNC: 1.18 MG/DL (ref 0.7–1.3)
EOSINOPHIL # BLD AUTO: 0 10E3/UL (ref 0–0.7)
EOSINOPHIL NFR BLD AUTO: 0 %
ERYTHROCYTE [DISTWIDTH] IN BLOOD BY AUTOMATED COUNT: 12.1 % (ref 10–15)
GFR SERPL CREATININE-BSD FRML MDRD: 87 ML/MIN/1.73M2
GLUCOSE BLD-MCNC: 96 MG/DL (ref 70–105)
HCT VFR BLD AUTO: 53 % (ref 40–53)
HGB BLD-MCNC: 19.1 G/DL (ref 13.3–17.7)
IMM GRANULOCYTES # BLD: 0 10E3/UL
IMM GRANULOCYTES NFR BLD: 0 %
LYMPHOCYTES # BLD AUTO: 1.4 10E3/UL (ref 0.8–5.3)
LYMPHOCYTES NFR BLD AUTO: 19 %
MCH RBC QN AUTO: 30.9 PG (ref 26.5–33)
MCHC RBC AUTO-ENTMCNC: 36 G/DL (ref 31.5–36.5)
MCV RBC AUTO: 86 FL (ref 78–100)
MONOCYTES # BLD AUTO: 1.1 10E3/UL (ref 0–1.3)
MONOCYTES NFR BLD AUTO: 16 %
NEUTROPHILS # BLD AUTO: 4.5 10E3/UL (ref 1.6–8.3)
NEUTROPHILS NFR BLD AUTO: 64 %
NRBC # BLD AUTO: 0 10E3/UL
NRBC BLD AUTO-RTO: 0 /100
PLATELET # BLD AUTO: 222 10E3/UL (ref 150–450)
POTASSIUM BLD-SCNC: 4.1 MMOL/L (ref 3.5–5.1)
PROT SERPL-MCNC: 8 G/DL (ref 6.4–8.9)
RBC # BLD AUTO: 6.18 10E6/UL (ref 4.4–5.9)
SODIUM SERPL-SCNC: 134 MMOL/L (ref 134–144)
WBC # BLD AUTO: 7.1 10E3/UL (ref 4–11)

## 2022-11-02 PROCEDURE — 36415 COLL VENOUS BLD VENIPUNCTURE: CPT | Mod: ZL

## 2022-11-02 PROCEDURE — 85025 COMPLETE CBC W/AUTO DIFF WBC: CPT | Mod: ZL

## 2022-11-02 PROCEDURE — 80053 COMPREHEN METABOLIC PANEL: CPT | Mod: ZL

## 2022-11-02 PROCEDURE — 99213 OFFICE O/P EST LOW 20 MIN: CPT | Performed by: NURSE PRACTITIONER

## 2022-11-02 ASSESSMENT — PAIN SCALES - GENERAL: PAINLEVEL: NO PAIN (0)

## 2022-11-02 NOTE — PROGRESS NOTES
ASSESSMENT/PLAN:     I have reviewed the nursing notes.  I have reviewed the findings, diagnosis, plan and need for follow up with the patient.      1. Diarrhea of presumed infectious origin    - CBC and Differential  - Comprehensive Metabolic Panel      Discussed with patient that symptoms and exam are consistent with viral illness.    No clinical indications for antibiotic treatment at this time.    CBC - normal  CMP - minimally elevated AST, ALT, and Bilirubin.      Patient is to follow up with his PCP in 1 to 2 months for recheck and sooner if worsening        I explained my diagnostic considerations and recommendations to the patient, who voiced understanding and agreement with the treatment plan. All questions were answered. We discussed potential side effects of any prescribed or recommended therapies, as well as expectations for response to treatments.    No name on file  [facility]      SUBJECTIVE:   Fortunato Ocasio is a 27 year old male who presents to clinic today for the following health issues:  Diarrhea    HPI  States diarrhea for the past 3 days.  Stools are frequent, at least 10 per day.  Stools are watery.  No noted black, tarry, maroon, or bloody stools.  Scant blood on toilet paper from wiping.  No rectal pain or pressure.  Hx of external hemorrhoids.  No fevers or chills.  No nausea, vomiting.  No heartburn or reflux.  No bloating.  No abdominal pain or cramps.  Appetite decreased.  No change in stools with eating vs not eating.  Drinking fluids well.  No dysuria, frequency, hematuria, decreased urine or difficulty with urinary stream.  No headaches. No light headedness or dizziness.   No body aches.  Decreased energy due to poor sleep from frequent bathroom use.  No cough, shortness of breath. No sore throat.  No known hx of colitis, IBS, or crohns.  No new medications.  No new foods.  No known exposures.  No one else at home with symptoms.  City water and filtered.   "  Tried tums and pepto without relief.          No past medical history on file.  Past Surgical History:   Procedure Laterality Date     EXTRACTION(S) DENTAL      No Comments Provided     TONSILLECTOMY, ADENOIDECTOMY, COMBINED      No Comments Provided     Social History     Tobacco Use     Smoking status: Never     Smokeless tobacco: Never   Substance Use Topics     Alcohol use: Yes     Comment: Alcoholic Drinks/day: Occ     Current Outpatient Medications   Medication Sig Dispense Refill     acetaminophen (TYLENOL) 325 MG tablet Take 325-650 mg by mouth (Patient not taking: Reported on 11/2/2022)       HYDROcodone-acetaminophen (NORCO) 5-325 MG tablet Take 1-2 tablets by mouth (Patient not taking: Reported on 11/2/2022)       Allergies   Allergen Reactions     Peanut (Diagnostic) Headache     Peanuts [Nuts] Headache     Not sure, but notices migraines next day after eating peanuts.      Penicillins Swelling     All \"cillins\"         Past medical history, past surgical history, current medications and allergies reviewed and accurate to the best of my knowledge.        OBJECTIVE:     /80 (BP Location: Left arm, Patient Position: Sitting, Cuff Size: Adult Large)   Pulse 105   Temp 97.6  F (36.4  C) (Tympanic)   Resp 16   Ht 1.905 m (6' 3\")   Wt 132.9 kg (293 lb)   SpO2 98%   BMI 36.62 kg/m    Body mass index is 36.62 kg/m .     Physical Exam  General Appearance: Well appearing adult male, appropriate appearance for age. No acute distress  Respiratory: normal chest wall and respirations.  Normal effort.  Clear to auscultation bilaterally, no wheezing, crackles or rhonchi.  No increased work of breathing.  No cough appreciated.  Cardiac: RRR with no murmurs  Abdomen: soft, nontender, no rigidity, no rebound tenderness or guarding, normal bowel sounds present  :  No suprapubic tenderness to palpation.  No CVA tenderness to palpation.   Rectum:  Declines/refuses    Musculoskeletal:  Equal movement of " bilateral upper extremities.  Equal movement of bilateral lower extremities.  Normal gait.   Psychological: normal affect, alert, oriented, and pleasant.       Labs:  Results for orders placed or performed in visit on 11/02/22   Comprehensive Metabolic Panel     Status: Abnormal   Result Value Ref Range    Sodium 134 134 - 144 mmol/L    Potassium 4.1 3.5 - 5.1 mmol/L    Chloride 99 98 - 107 mmol/L    Carbon Dioxide (CO2) 26 21 - 31 mmol/L    Anion Gap 9 3 - 14 mmol/L    Urea Nitrogen 19 7 - 25 mg/dL    Creatinine 1.18 0.70 - 1.30 mg/dL    Calcium 9.9 8.6 - 10.3 mg/dL    Glucose 96 70 - 105 mg/dL    Alkaline Phosphatase 84 34 - 104 U/L    AST 56 (H) 13 - 39 U/L    ALT 98 (H) 7 - 52 U/L    Protein Total 8.0 6.4 - 8.9 g/dL    Albumin 4.9 3.5 - 5.7 g/dL    Bilirubin Total 2.3 (H) 0.3 - 1.0 mg/dL    GFR Estimate 87 >60 mL/min/1.73m2   CBC with platelets and differential     Status: Abnormal   Result Value Ref Range    WBC Count 7.1 4.0 - 11.0 10e3/uL    RBC Count 6.18 (H) 4.40 - 5.90 10e6/uL    Hemoglobin 19.1 (H) 13.3 - 17.7 g/dL    Hematocrit 53.0 40.0 - 53.0 %    MCV 86 78 - 100 fL    MCH 30.9 26.5 - 33.0 pg    MCHC 36.0 31.5 - 36.5 g/dL    RDW 12.1 10.0 - 15.0 %    Platelet Count 222 150 - 450 10e3/uL    % Neutrophils 64 %    % Lymphocytes 19 %    % Monocytes 16 %    % Eosinophils 0 %    % Basophils 1 %    % Immature Granulocytes 0 %    NRBCs per 100 WBC 0 <1 /100    Absolute Neutrophils 4.5 1.6 - 8.3 10e3/uL    Absolute Lymphocytes 1.4 0.8 - 5.3 10e3/uL    Absolute Monocytes 1.1 0.0 - 1.3 10e3/uL    Absolute Eosinophils 0.0 0.0 - 0.7 10e3/uL    Absolute Basophils 0.1 0.0 - 0.2 10e3/uL    Absolute Immature Granulocytes 0.0 <=0.4 10e3/uL    Absolute NRBCs 0.0 10e3/uL   CBC and Differential     Status: Abnormal    Narrative    The following orders were created for panel order CBC and Differential.  Procedure                               Abnormality         Status                     ---------                                -----------         ------                     CBC with platelets and d...[[id number]]  Abnormal            Final result                 Please view results for these tests on the individual orders.

## 2022-11-02 NOTE — NURSING NOTE
Chief Complaint   Patient presents with     Diarrhea     X 3 days- going at least 10x a day     Patient in clinic with wife for diarrhea x 3 days. Patient is going at least 10 times a day -   No home tx     Patient is drinking water.    Advanced Care Planning on file? no    Medication Review Completed: complete    FOOD SECURITY SCREENING QUESTIONS:    The next two questions are to help us understand your food security.  If you are feeling you need any assistance in this area, we have resources available to support you today.    Hunger Vital Signs:  Within the past 12 months we worried whether our food would run out before we got money to buy more. Never  Within the past 12 months the food we bought just didn't last and we didn't have money to get more. Never    Emilia Tang LPN

## 2023-01-23 ENCOUNTER — HEALTH MAINTENANCE LETTER (OUTPATIENT)
Age: 28
End: 2023-01-23

## 2023-05-02 ENCOUNTER — TRANSFERRED RECORDS (OUTPATIENT)
Dept: HEALTH INFORMATION MANAGEMENT | Facility: OTHER | Age: 28
End: 2023-05-02
Payer: COMMERCIAL

## 2024-02-06 ENCOUNTER — OFFICE VISIT (OUTPATIENT)
Dept: FAMILY MEDICINE | Facility: OTHER | Age: 29
End: 2024-02-06
Attending: PHYSICIAN ASSISTANT
Payer: COMMERCIAL

## 2024-02-06 VITALS
HEIGHT: 75 IN | SYSTOLIC BLOOD PRESSURE: 128 MMHG | HEART RATE: 73 BPM | OXYGEN SATURATION: 98 % | TEMPERATURE: 97.5 F | DIASTOLIC BLOOD PRESSURE: 80 MMHG | RESPIRATION RATE: 14 BRPM | WEIGHT: 315 LBS | BODY MASS INDEX: 39.17 KG/M2

## 2024-02-06 DIAGNOSIS — H69.92 DYSFUNCTION OF LEFT EUSTACHIAN TUBE: Primary | ICD-10-CM

## 2024-02-06 PROCEDURE — 99213 OFFICE O/P EST LOW 20 MIN: CPT | Performed by: PHYSICIAN ASSISTANT

## 2024-02-06 ASSESSMENT — PAIN SCALES - GENERAL: PAINLEVEL: NO PAIN (0)

## 2024-02-06 NOTE — PROGRESS NOTES
Assessment & Plan     1. Dysfunction of left eustachian tube  Symptoms and exam consistent with eustachian tube dysfunction on left.  No signs of infection.  Encouraged symptomatic management with over-the-counter nasal oral decongestant.  Follow-up if symptoms persist or worsen.    No follow-ups on file.    Rey Bucio is a 28 year old, presenting for the following health issues:  Ear Problem    History of Present Illness       Reason for visit:  Ear pressure, ringing.  Symptom onset:  1-3 days ago  Symptoms include:  Ear pressure, ringing. Hard of hearing  Symptom intensity:  Mild  Symptom progression:  Staying the same  Had these symptoms before:  No  What makes it worse:  No  What makes it better:  No    He eats 0-1 servings of fruits and vegetables daily.He consumes 0 sweetened beverage(s) daily.He exercises with enough effort to increase his heart rate 10 to 19 minutes per day.  He exercises with enough effort to increase his heart rate 4 days per week.   He is taking medications regularly.     Here for evaluation of plugged left ear. Left ear became clogged with ringing and muffled hearing yesterday. He does not have symptoms in his right ear.  No drainage.  He denies pain, fevers, nasal congestion, or other respiratory symptoms.     PAST MEDICAL HISTORY: No past medical history on file.    PAST SURGICAL HISTORY:   Past Surgical History:   Procedure Laterality Date    EXTRACTION(S) DENTAL      No Comments Provided    TONSILLECTOMY, ADENOIDECTOMY, COMBINED      No Comments Provided       FAMILY HISTORY:   Family History   Problem Relation Age of Onset    Diabetes Mother         Diabetes       SOCIAL HISTORY:   Social History     Tobacco Use    Smoking status: Never     Passive exposure: Never    Smokeless tobacco: Never   Substance Use Topics    Alcohol use: Yes     Comment: Alcoholic Drinks/day: Occ        Allergies   Allergen Reactions    Peanut (Diagnostic) Headache    Peanuts [Nuts] Headache      "Not sure, but notices migraines next day after eating peanuts.     Penicillins Swelling     All \"cillins\"     Current Outpatient Medications   Medication    acetaminophen (TYLENOL) 325 MG tablet    HYDROcodone-acetaminophen (NORCO) 5-325 MG tablet     No current facility-administered medications for this visit.         Review of Systems  Per HPI        Objective    /80   Pulse 73   Temp 97.5  F (36.4  C)   Resp 14   Ht 1.905 m (6' 3\")   Wt 146.2 kg (322 lb 6.4 oz)   SpO2 98%   BMI 40.30 kg/m    Body mass index is 40.3 kg/m .  Physical Exam   General: Pleasant, in no apparent distress.  Eyes: Sclera are white and conjunctiva are clear bilaterally. Lacrimal apparatus free of erythema, edema, and discharge bilaterally.  Ears: External ears without erythema or edema. Tympanic membranes are pearly white and without erythema, scarring or perforations bilaterally. External auditory canals are free of foreign bodies, erythema, ulcers, and masses.  Nose: External nose is symmetrical and free of lesions and deformities.  Oropharynx: Oral mucosa is pink and without ulcers, nodules, and white patches. Tongue is symmetrical, pink, and without masses or lesions. Pharynx is pink, symmetrical, and without lesions. Uvula is midline. Tonsils are pink, symmetrical, and without edema, ulcers, or exudates, and 1+ bilaterally.  Neck: No cervical lymphadenopathy on inspection and palpation.  Psych: Appropriate mood and affect.      Signed Electronically by: Carol Cody PA-C    "

## 2024-02-06 NOTE — NURSING NOTE
Patient presents to clinic with left ear feeling plugged and ringing that started yesterday.  Rula Morales, MIL[facility] ....................  2/6/2024   2:48 PM  EXT [id number]

## 2024-02-24 ENCOUNTER — HEALTH MAINTENANCE LETTER (OUTPATIENT)
Age: 29
End: 2024-02-24

## 2024-06-08 ENCOUNTER — OFFICE VISIT (OUTPATIENT)
Dept: FAMILY MEDICINE | Facility: OTHER | Age: 29
End: 2024-06-08
Attending: STUDENT IN AN ORGANIZED HEALTH CARE EDUCATION/TRAINING PROGRAM
Payer: COMMERCIAL

## 2024-06-08 VITALS
WEIGHT: 309.8 LBS | BODY MASS INDEX: 38.72 KG/M2 | SYSTOLIC BLOOD PRESSURE: 118 MMHG | OXYGEN SATURATION: 98 % | HEART RATE: 85 BPM | TEMPERATURE: 98.1 F | RESPIRATION RATE: 16 BRPM | DIASTOLIC BLOOD PRESSURE: 76 MMHG

## 2024-06-08 DIAGNOSIS — S61.317A LACERATION OF LEFT LITTLE FINGER WITHOUT FOREIGN BODY WITH DAMAGE TO NAIL, INITIAL ENCOUNTER: Primary | ICD-10-CM

## 2024-06-08 PROCEDURE — 99213 OFFICE O/P EST LOW 20 MIN: CPT | Performed by: STUDENT IN AN ORGANIZED HEALTH CARE EDUCATION/TRAINING PROGRAM

## 2024-06-08 ASSESSMENT — PAIN SCALES - GENERAL: PAINLEVEL: MILD PAIN (3)

## 2024-06-09 NOTE — PROGRESS NOTES
Assessment & Plan     (S61.317A) Laceration of left little finger without foreign body with damage to nail, initial encounter  (primary encounter diagnosis)    Comment: Laceration to the left pinky finger, this is such a laceration such that there will be no repair.  It is simply removal of part of the tissue of the tip of the finger.  It did damage the end of the nail but not the nailbed.  He does have full range of motion of the finger suggesting that there is no tendon injury.  No evidence of bony involvement.  Bleeding was controlled in the office.  Area was cleansed with Hibiclens soak.  Zinc Allevyn was placed over the area.  Tetanus was last completed on 11/13/2020, up-to-date.    Plan: It should continue to heal by secondary intent.  Keep the area covered and dried until scab forms.  Follow-up with primary care if not improving.  Return to [facility] or ER if symptoms worsen or change.  He is comfortable and agreeable with this plan.    Rey Bucio is a 29 year old, presenting for the following health issues:  Laceration (Left pinky finger)    HPI     Patient presents today with concerns of left pinky finger injury.  States he was using a table saw earlier today and cut off the tip of his left pinky finger.  He notes that he was able to control the bleeding with pressure.  He continues to have full range of motion of the finger.  He does not take any blood thinners.      Review of Systems  Constitutional, HEENT, cardiovascular, pulmonary, gi and gu systems are negative, except as otherwise noted.        Objective    /76 (BP Location: Right arm, Patient Position: Sitting, Cuff Size: Adult Large)   Pulse 85   Temp 98.1  F (36.7  C) (Temporal)   Resp 16   Wt 140.5 kg (309 lb 12.8 oz)   SpO2 98%   BMI 38.72 kg/m    Body mass index is 38.72 kg/m .    Physical Exam   GENERAL: alert and no distress  Respiratory: No increased work of breathing  MSK: Approximately 3 mm deep by 5 mm wide  laceration across the tip of left pinky finger extending across the tip of the nail with muscle visible at the base and fatty tissue extending across the edges of the laceration, full range of motion of the finger, light touch sensation is intact, bleeding controlled        Signed Electronically by: Kindra Ovalle PA-C

## 2025-03-09 ENCOUNTER — HEALTH MAINTENANCE LETTER (OUTPATIENT)
Age: 30
End: 2025-03-09

## 2025-04-01 ENCOUNTER — OFFICE VISIT (OUTPATIENT)
Dept: FAMILY MEDICINE | Facility: OTHER | Age: 30
End: 2025-04-01
Attending: NURSE PRACTITIONER
Payer: COMMERCIAL

## 2025-04-01 VITALS
DIASTOLIC BLOOD PRESSURE: 72 MMHG | BODY MASS INDEX: 39.17 KG/M2 | RESPIRATION RATE: 20 BRPM | HEIGHT: 75 IN | TEMPERATURE: 98.3 F | OXYGEN SATURATION: 98 % | SYSTOLIC BLOOD PRESSURE: 120 MMHG | WEIGHT: 315 LBS | HEART RATE: 91 BPM

## 2025-04-01 DIAGNOSIS — M54.50 ACUTE RIGHT-SIDED LOW BACK PAIN WITHOUT SCIATICA: Primary | ICD-10-CM

## 2025-04-01 DIAGNOSIS — T14.8XXA PULLED MUSCLE: ICD-10-CM

## 2025-04-01 RX ORDER — DICLOFENAC SODIUM 75 MG/1
75 TABLET, DELAYED RELEASE ORAL 2 TIMES DAILY
Qty: 20 TABLET | Refills: 0 | Status: SHIPPED | OUTPATIENT
Start: 2025-04-01

## 2025-04-01 ASSESSMENT — ENCOUNTER SYMPTOMS
RESPIRATORY NEGATIVE: 1
CARDIOVASCULAR NEGATIVE: 1
CONSTITUTIONAL NEGATIVE: 1
BACK PAIN: 1
NUMBNESS: 0
WEAKNESS: 0

## 2025-04-01 ASSESSMENT — PAIN SCALES - GENERAL: PAINLEVEL_OUTOF10: MODERATE PAIN (6)

## 2025-04-01 NOTE — NURSING NOTE
"Chief Complaint   Patient presents with    Back Pain     Lower back      Patient here for lower back pain that started this morning after pulling an empty trash can and feeling a twinge. Has treated with ibuprofen. States he has hx of lower back surgery x3 years ago and wants to make sure everything is okay.     Initial /72   Pulse 91   Temp 98.3  F (36.8  C) (Tympanic)   Resp 20   Ht 1.905 m (6' 3\")   Wt (!) 145.2 kg (320 lb)   SpO2 98%   BMI 40.00 kg/m   Estimated body mass index is 40 kg/m  as calculated from the following:    Height as of this encounter: 1.905 m (6' 3\").    Weight as of this encounter: 145.2 kg (320 lb).  Medication Review: complete    The next two questions are to help us understand your food security.  If you are feeling you need any assistance in this area, we have resources available to support you today.          4/1/2025   SDOH- Food Insecurity   Within the past 12 months, did you worry that your food would run out before you got money to buy more? N   Within the past 12 months, did the food you bought just not last and you didn t have money to get more? N         Health Care Directive:  Patient does not have a Health Care Directive: Discussed advance care planning with patient; however, patient declined at this time.    Elle Zepeda LPN      "

## 2025-04-01 NOTE — PROGRESS NOTES
Fortunato Ocasio  1995    ASSESSMENT/PLAN      1. Acute right-sided low back pain without sciatica (Primary)  - diclofenac (VOLTAREN) 75 MG EC tablet; Take 1 tablet (75 mg) by mouth 2 times daily.  Dispense: 20 tablet; Refill: 0  2. Pulled muscle    - Diclofenac 75 mg tablets provided for acute right-sided low back pain secondary to pulled muscle.  - Declined muscle relaxer.  - Recommending activity as tolerated, rest, and ice/heat.  - No indication for x-ray at this time.  - May use over-the-counter Tylenol PRN  - Follow up as needed for new or worsening symptoms          *Explanation of diagnosis, treatment options and risk and benefits of medications reviewed with patient. Patient agrees with plan of care.  *All questions were answered.    *Red flags symptoms were discussed and patient was advised when they should return for reevaluation or for prompt emergency evaluation.   *Patient was given verbal and written instructions on plan of care. Instructions were printed or are available on vib[facility] on electronic AVS.   *We discussed potential side effects of any prescribed or recommended therapies, as well as expectations for response to treatments.  *Patient discharged in stable condition    Damián Johnson, MARKELL, APRN, FNP-C  [facility]    SUBJECTIVE  CHIEF COMPLAINT/ REASON FOR VISIT  Patient presents with:  Back Pain: Lower back      HISTORY OF PRESENT ILLNESS  Fortunato Ocasio is a pleasant 29 year old male presents to [facility] today with lower back pain.  Since this morning patient has had right-sided lower back pain due to moving and twisting a trash can.  He states he felt a twinge to lower back when this happened.  He reports he had lumbar discectomy of L5 approximately 3 years ago.  Patient denies any referred pains, numbness or tingling, or focal leg weakness.      History provided by patient      I have reviewed the nursing notes.  I have reviewed allergies, medication list,  "problem list, and past medical history.    REVIEW OF SYSTEMS  Review of Systems   Constitutional: Negative.    HENT: Negative.     Respiratory: Negative.     Cardiovascular: Negative.    Musculoskeletal:  Positive for back pain.   Neurological:  Negative for weakness and numbness.        VITAL SIGNS  Vitals:    04/01/25 1404   BP: 120/72   Pulse: 91   Resp: 20   Temp: 98.3  F (36.8  C)   TempSrc: Tympanic   SpO2: 98%   Weight: (!) 145.2 kg (320 lb)   Height: 1.905 m (6' 3\")      Body mass index is 40 kg/m .      OBJECTIVE  PHYSICAL EXAM  Physical Exam  Vitals and nursing note reviewed.   Constitutional:       General: He is not in acute distress.     Appearance: Normal appearance. He is obese. He is not ill-appearing, toxic-appearing or diaphoretic.   Cardiovascular:      Rate and Rhythm: Normal rate and regular rhythm.      Pulses: Normal pulses.      Heart sounds: Normal heart sounds.   Pulmonary:      Effort: Pulmonary effort is normal. No respiratory distress.      Breath sounds: Normal breath sounds. No wheezing or rhonchi.   Musculoskeletal:      Lumbar back: Tenderness present. No swelling or spasms. Normal range of motion.      Comments: Tenderness to right lower back with deep palpation   Neurological:      Mental Status: He is alert.            DIAGNOSTICS  No results found for any visits on 04/01/25.   "

## (undated) RX ORDER — LIDOCAINE HYDROCHLORIDE AND EPINEPHRINE 10; 10 MG/ML; UG/ML
INJECTION, SOLUTION INFILTRATION; PERINEURAL
Status: DISPENSED
Start: 2020-11-13

## (undated) RX ORDER — KETOROLAC TROMETHAMINE 30 MG/ML
INJECTION, SOLUTION INTRAMUSCULAR; INTRAVENOUS
Status: DISPENSED
Start: 2021-10-17

## (undated) RX ORDER — KETOROLAC TROMETHAMINE 30 MG/ML
INJECTION, SOLUTION INTRAMUSCULAR; INTRAVENOUS
Status: DISPENSED
Start: 2022-02-07

## (undated) RX ORDER — NEOMYCIN/BACITRACIN/POLYMYXINB 3.5-400-5K
OINTMENT (GRAM) TOPICAL
Status: DISPENSED
Start: 2020-11-13

## (undated) RX ORDER — LIDOCAINE HYDROCHLORIDE 10 MG/ML
INJECTION, SOLUTION EPIDURAL; INFILTRATION; INTRACAUDAL; PERINEURAL
Status: DISPENSED
Start: 2020-11-13